# Patient Record
Sex: MALE | Race: BLACK OR AFRICAN AMERICAN | NOT HISPANIC OR LATINO | Employment: FULL TIME | URBAN - METROPOLITAN AREA
[De-identification: names, ages, dates, MRNs, and addresses within clinical notes are randomized per-mention and may not be internally consistent; named-entity substitution may affect disease eponyms.]

---

## 2017-02-22 ENCOUNTER — PATIENT MESSAGE (OUTPATIENT)
Dept: INTERNAL MEDICINE | Facility: CLINIC | Age: 27
End: 2017-02-22

## 2017-02-23 DIAGNOSIS — J11.1 INFLUENZA: Primary | ICD-10-CM

## 2017-02-23 RX ORDER — OSELTAMIVIR PHOSPHATE 75 MG/1
75 CAPSULE ORAL 2 TIMES DAILY
Qty: 10 CAPSULE | Refills: 0 | Status: SHIPPED | OUTPATIENT
Start: 2017-02-23 | End: 2017-02-28

## 2017-04-24 ENCOUNTER — PATIENT MESSAGE (OUTPATIENT)
Dept: INTERNAL MEDICINE | Facility: CLINIC | Age: 27
End: 2017-04-24

## 2017-04-25 ENCOUNTER — TELEPHONE (OUTPATIENT)
Dept: INTERNAL MEDICINE | Facility: CLINIC | Age: 27
End: 2017-04-25

## 2017-04-25 NOTE — TELEPHONE ENCOUNTER
Vitaly Hughes I was wondering if you could tell what's the best allergy medication to take

## 2017-04-25 NOTE — TELEPHONE ENCOUNTER
Vitaly Hughes I was wondering if you could tell what's the best allergy medication to take. Please advise

## 2017-04-25 NOTE — TELEPHONE ENCOUNTER
I would recommend OTC flonase and zyrtec (may need to take at night if makes you sleepy). Ok to use generic store brand version. Name brand not needed.

## 2017-07-03 ENCOUNTER — OFFICE VISIT (OUTPATIENT)
Dept: INTERNAL MEDICINE | Facility: CLINIC | Age: 27
End: 2017-07-03
Payer: COMMERCIAL

## 2017-07-03 ENCOUNTER — LAB VISIT (OUTPATIENT)
Dept: LAB | Facility: HOSPITAL | Age: 27
End: 2017-07-03
Attending: NURSE PRACTITIONER
Payer: COMMERCIAL

## 2017-07-03 VITALS
TEMPERATURE: 96 F | RESPIRATION RATE: 18 BRPM | BODY MASS INDEX: 34.84 KG/M2 | DIASTOLIC BLOOD PRESSURE: 69 MMHG | SYSTOLIC BLOOD PRESSURE: 127 MMHG | OXYGEN SATURATION: 98 % | WEIGHT: 248.88 LBS | HEIGHT: 71 IN | HEART RATE: 68 BPM

## 2017-07-03 DIAGNOSIS — L20.9 MILD ATOPIC DERMATITIS: ICD-10-CM

## 2017-07-03 DIAGNOSIS — Z11.3 SCREEN FOR STD (SEXUALLY TRANSMITTED DISEASE): ICD-10-CM

## 2017-07-03 DIAGNOSIS — Z00.00 ENCOUNTER FOR PREVENTIVE HEALTH EXAMINATION: Primary | ICD-10-CM

## 2017-07-03 DIAGNOSIS — Z00.00 ENCOUNTER FOR PREVENTIVE HEALTH EXAMINATION: ICD-10-CM

## 2017-07-03 LAB
ALBUMIN SERPL BCP-MCNC: 4.1 G/DL
ALP SERPL-CCNC: 87 U/L
ALT SERPL W/O P-5'-P-CCNC: 55 U/L
ANION GAP SERPL CALC-SCNC: 8 MMOL/L
AST SERPL-CCNC: 26 U/L
BASOPHILS # BLD AUTO: 0.02 K/UL
BASOPHILS NFR BLD: 0.3 %
BILIRUB SERPL-MCNC: 0.9 MG/DL
BUN SERPL-MCNC: 12 MG/DL
CALCIUM SERPL-MCNC: 9.7 MG/DL
CHLORIDE SERPL-SCNC: 102 MMOL/L
CO2 SERPL-SCNC: 28 MMOL/L
CREAT SERPL-MCNC: 1 MG/DL
DIFFERENTIAL METHOD: ABNORMAL
EOSINOPHIL # BLD AUTO: 0.1 K/UL
EOSINOPHIL NFR BLD: 1.4 %
ERYTHROCYTE [DISTWIDTH] IN BLOOD BY AUTOMATED COUNT: 12.6 %
EST. GFR  (AFRICAN AMERICAN): >60 ML/MIN/1.73 M^2
EST. GFR  (NON AFRICAN AMERICAN): >60 ML/MIN/1.73 M^2
GLUCOSE SERPL-MCNC: 89 MG/DL
HCT VFR BLD AUTO: 45.5 %
HGB BLD-MCNC: 15.8 G/DL
LYMPHOCYTES # BLD AUTO: 2.3 K/UL
LYMPHOCYTES NFR BLD: 36.6 %
MCH RBC QN AUTO: 31.3 PG
MCHC RBC AUTO-ENTMCNC: 34.7 %
MCV RBC AUTO: 90 FL
MONOCYTES # BLD AUTO: 0.3 K/UL
MONOCYTES NFR BLD: 4.7 %
NEUTROPHILS # BLD AUTO: 3.6 K/UL
NEUTROPHILS NFR BLD: 56.8 %
PLATELET # BLD AUTO: 199 K/UL
PMV BLD AUTO: 10.8 FL
POTASSIUM SERPL-SCNC: 4.2 MMOL/L
PROT SERPL-MCNC: 7.9 G/DL
RBC # BLD AUTO: 5.04 M/UL
SODIUM SERPL-SCNC: 138 MMOL/L
TSH SERPL DL<=0.005 MIU/L-ACNC: 0.72 UIU/ML
WBC # BLD AUTO: 6.39 K/UL

## 2017-07-03 PROCEDURE — 85025 COMPLETE CBC W/AUTO DIFF WBC: CPT | Mod: PO

## 2017-07-03 PROCEDURE — 86592 SYPHILIS TEST NON-TREP QUAL: CPT

## 2017-07-03 PROCEDURE — 80053 COMPREHEN METABOLIC PANEL: CPT | Mod: PO

## 2017-07-03 PROCEDURE — 86703 HIV-1/HIV-2 1 RESULT ANTBDY: CPT

## 2017-07-03 PROCEDURE — 99999 PR PBB SHADOW E&M-EST. PATIENT-LVL IV: CPT | Mod: PBBFAC,,, | Performed by: NURSE PRACTITIONER

## 2017-07-03 PROCEDURE — 36415 COLL VENOUS BLD VENIPUNCTURE: CPT | Mod: PO

## 2017-07-03 PROCEDURE — 87591 N.GONORRHOEAE DNA AMP PROB: CPT

## 2017-07-03 PROCEDURE — 80061 LIPID PANEL: CPT

## 2017-07-03 PROCEDURE — 99395 PREV VISIT EST AGE 18-39: CPT | Mod: S$GLB,,, | Performed by: NURSE PRACTITIONER

## 2017-07-03 PROCEDURE — 84443 ASSAY THYROID STIM HORMONE: CPT | Mod: PO

## 2017-07-03 RX ORDER — TRIAMCINOLONE ACETONIDE 1 MG/G
CREAM TOPICAL 2 TIMES DAILY
Qty: 45 G | Refills: 0 | Status: SHIPPED | OUTPATIENT
Start: 2017-07-03 | End: 2018-05-23 | Stop reason: ALTCHOICE

## 2017-07-03 NOTE — PATIENT INSTRUCTIONS
Managing Atopic Dermatitis     After bathing, gently pat your skin dry (dont rub). Apply moisturizer while your skin is still damp.   To manage your symptoms and help reduce the severity and frequency, try these self-care tips:  Caring for your skin  · Use a gentle, fragrance-free cleanser (or nonsoap cleanser) for bathing. Rinse well. Pat skin dry.  · Take warm, not hot, baths.   · Use moisturizer liberally right after you bathe, while your skin is still damp.  · Avoid scratching because it will cause more damage to your skin.   · Topical, over-the-counter hydrocortisone cream may help control mild symptoms.   Controlling your environment  · Avoid extremes of heat or cold.  · Avoid very humid or very dry air.  · If your home or office air is very dry, use a humidifier.  · Avoid allergens, such as dust, that may be present in bedding, carpets, plush toys, or rugs.  · Know that pet hair and dander can cause flare-ups.  Seeking medical treatment  Another way to keep symptoms under control is to seek medical treatment. Talk with your health care provider about the type of treatment that may work best for you. A topical treatment may be prescribed to put on the skin daily. Oral medications (taken by mouth) may also be prescribed. Among medications you may be given are antihistamines, antibiotics, or corticosteroids. Sometimes injections may be needed to control the symptoms, and you may even need antibiotics if skin infections occur. Treatments do not work the same way for every patient. So, if symptoms persist or get worse, ask your health care provider about other treatments.  Making lifestyle choices  · Manage the stress in your life.  · Wear loose-fitting cotton clothing that does not bind or rub the skin.  · Avoid contact with wool or other scratchy fabrics.  · Use fragrance-free products.  Getting good results  Now that you know more about atopic dermatitis, the next step is up to you. Follow your health care  providers treatment plan and your self-care routine. This will help bring atopic dermatitis under control. If your symptoms persist, be sure to let your health care provider know.   Date Last Reviewed: 5/10/2015  © 6292-8131 [employer] [address] [place] [address], [place] [address]. All rights reserved. This information is not intended as a substitute for professional medical care. Always follow your healthcare professional's instructions.        Atopic Dermatitis (Adult)  Atopic dermatitis is a dry, itchy, red rash. Its also called eczema. The rash is chronic, or ongoing. It can come and go over time. The disease is often passed down in families. It causes a problem with the skin barrier that makes the skin more sensitive to the environment and other factors. The increased skin sensitivity causes an itch, which causes scratching. Scratching can worsen the itching or also break the skin. This can put the skin at risk of infection.  The condition is most common in people with asthma, hay fever, hives, or dry or sensitive skin. The rash may be caused by extreme heat or heavy sweating. Skin irritants can cause the rash to flare up. These can include wool or silk clothing, grease, oils, some medicines, and harsh soaps and detergents. Emotional stress can also be a trigger.  Treatment is done to relieve the itching and inflammation of the skin.  Home care  Follow these tips to care for your condition:  · Keep the areas of rash clean by bathing at least every other day. Use lukewarm water to bathe. Dont use hot water, which can dry out the skin.  · Dont use soaps with strong detergents. Use mild soaps made for sensitive skin.  · Apply a cream or ointment to damp skin right after bathing.  · Avoid things that irritate your skin. Wear absorbent, soft fabrics next to the skin rather than rough or scratchy materials.  · Use mild laundry soap free of scents and perfumes. Make sure to rinse all the soap out of  your clothes.  · Treat any skin infection as directed.  · Use oral diphenhydramine to help reduce itching. This is an antihistamine you can buy at drug and grocery stores. It can make you sleepy, so use lower doses during the daytime. Or you can use loratadine. This is an antihistamine that will not make you sleepy. Do not use diphenhydramine if you have glaucoma or have trouble urinating due to an enlarged prostate.  Follow-up care  See your healthcare provider, or as advised. If your symptoms dont get better or if they get worse in the next 7 days, make an appointment with your healthcare provider.  When to seek medical advice  Call your healthcare provider right away  if any of these occur:  · Increasing area of redness or pain in the skin  · Yellow crusts or wet drainage from the rash  · Fever of 100.4°F (38°C) or higher, or as directed by your healthcare provider  Date Last Reviewed: 9/1/2016  © 5681-1830 [employer]. [address] [place] [address], [place] [address]. All rights reserved. This information is not intended as a substitute for professional medical care. Always follow your healthcare professional's instructions.

## 2017-07-03 NOTE — PROGRESS NOTES
Subjective:       Patient ID: Jorge Luis Gilmore is a 27 y.o. male.    Chief Complaint: Annual Exam    Mr Gilmore is here today for an annual physical. His only complaint is a rash      Rash   This is a chronic problem. The problem has been waxing and waning since onset. The affected locations include the back (neck). The rash is characterized by dryness and itchiness. He was exposed to nothing. (None) Past treatments include nothing.     Review of Systems   Constitutional: Negative.    Eyes: Negative.    Respiratory: Negative.    Cardiovascular: Negative.    Gastrointestinal: Positive for constipation (admits to low water intake). Negative for abdominal distention and nausea.   Endocrine: Negative.    Genitourinary: Negative.    Musculoskeletal: Negative.    Skin: Positive for rash.   Neurological: Positive for headaches (treats with ibuprofen with good results). Negative for dizziness and weakness.   Hematological: Negative.    Psychiatric/Behavioral: Negative.        Objective:      Physical Exam   Constitutional: He is oriented to person, place, and time. He appears well-developed and well-nourished. No distress.   HENT:   Head: Normocephalic and atraumatic.   Right Ear: Hearing, tympanic membrane, external ear and ear canal normal.   Left Ear: Hearing, tympanic membrane, external ear and ear canal normal.   Nose: Nose normal. No mucosal edema, rhinorrhea, sinus tenderness or nasal deformity.   Mouth/Throat: Oropharynx is clear and moist. No oral lesions. Normal dentition. No oropharyngeal exudate, posterior oropharyngeal edema or posterior oropharyngeal erythema.   Eyes: Conjunctivae, EOM and lids are normal. Pupils are equal, round, and reactive to light.   Neck: Normal range of motion and full passive range of motion without pain. Neck supple. No JVD present. No tracheal deviation present. No thyromegaly present.   Cardiovascular: Normal rate, regular rhythm, S1 normal, S2 normal, normal heart sounds and  intact distal pulses.    No murmur heard.  Pulmonary/Chest: Effort normal and breath sounds normal. No stridor. No respiratory distress.   Abdominal: Soft. Normal appearance and bowel sounds are normal. He exhibits no distension. There is no tenderness.   Musculoskeletal: Normal range of motion. He exhibits no edema or tenderness.   Lymphadenopathy:     He has no cervical adenopathy.   Neurological: He is alert and oriented to person, place, and time. He has normal reflexes.   Skin: Skin is warm, dry and intact. Rash (eczema type rash to neck and back) noted. He is not diaphoretic. No cyanosis. Nails show no clubbing.   Psychiatric: He has a normal mood and affect. His speech is normal and behavior is normal. Judgment and thought content normal. Cognition and memory are normal.       Assessment:       1. Encounter for preventive health examination    2. Mild atopic dermatitis        Plan:       *Encounter for preventive health examination  -     CBC auto differential; Future; Expected date: 07/03/2017  -     Comprehensive metabolic panel; Future; Expected date: 07/03/2017  -     Lipid panel; Future; Expected date: 07/03/2017  -     TSH; Future; Expected date: 07/03/2017    Mild atopic dermatitis  Change to gentle soap/ keep skin moisturized   -     triamcinolone acetonide 0.1% (KENALOG) 0.1 % cream; Apply topically 2 (two) times daily.  Dispense: 45 g; Refill: 0    Screen for STD (sexually transmitted disease)  -     RPR; Future; Expected date: 07/03/2017  -     HIV-1 and HIV-2 antibodies; Future; Expected date: 07/03/2017  -     C. trachomatis/N. gonorrhoeae by AMP DNA Urine    F/u per lab results, likely 1 year  **

## 2017-07-03 NOTE — LETTER
July 3, 2017               [facility]  Internal Medicine  [address] [address] [address]  Phone: [phone]   July 3, 2017     Patient: Jorge Luis Gilmore   YOB: 1990   Date of Visit: 7/3/2017       To Whom it May Concern:    Jorge Luis Gilmore was seen in my clinic on 7/3/2017. He may return to work on 7/3/2017. Please excuse his absence on 7/2/2017.     If you have any questions or concerns, please don't hesitate to call.    Sincerely,         RAJAT Corbett,FNP-C

## 2017-07-04 LAB
C TRACH DNA SPEC QL NAA+PROBE: NOT DETECTED
CHOLEST/HDLC SERPL: 4.6 {RATIO}
HDL/CHOLESTEROL RATIO: 21.7 %
HDLC SERPL-MCNC: 157 MG/DL
HDLC SERPL-MCNC: 34 MG/DL
LDLC SERPL CALC-MCNC: 101.2 MG/DL
N GONORRHOEA DNA SPEC QL NAA+PROBE: NOT DETECTED
NONHDLC SERPL-MCNC: 123 MG/DL
TRIGL SERPL-MCNC: 109 MG/DL

## 2017-07-05 ENCOUNTER — TELEPHONE (OUTPATIENT)
Dept: INTERNAL MEDICINE | Facility: CLINIC | Age: 27
End: 2017-07-05

## 2017-07-05 LAB
HIV 1+2 AB+HIV1 P24 AG SERPL QL IA: NEGATIVE
RPR SER QL: NORMAL

## 2017-07-05 NOTE — TELEPHONE ENCOUNTER
----- Message from RAJAT Corbett,FNP-C sent at 7/5/2017 10:53 AM CDT -----  All labs within acceptable range. Negative for STDs

## 2017-07-12 ENCOUNTER — TELEPHONE (OUTPATIENT)
Dept: INTERNAL MEDICINE | Facility: CLINIC | Age: 27
End: 2017-07-12

## 2018-05-23 ENCOUNTER — OFFICE VISIT (OUTPATIENT)
Dept: INTERNAL MEDICINE | Facility: CLINIC | Age: 28
End: 2018-05-23
Payer: COMMERCIAL

## 2018-05-23 VITALS
BODY MASS INDEX: 36.39 KG/M2 | OXYGEN SATURATION: 98 % | WEIGHT: 259.94 LBS | TEMPERATURE: 97 F | HEART RATE: 71 BPM | SYSTOLIC BLOOD PRESSURE: 127 MMHG | HEIGHT: 71 IN | DIASTOLIC BLOOD PRESSURE: 87 MMHG | RESPIRATION RATE: 18 BRPM

## 2018-05-23 DIAGNOSIS — L03.011 PARONYCHIA OF RIGHT THUMB: Primary | ICD-10-CM

## 2018-05-23 PROCEDURE — 99999 PR PBB SHADOW E&M-EST. PATIENT-LVL IV: CPT | Mod: PBBFAC,,, | Performed by: NURSE PRACTITIONER

## 2018-05-23 PROCEDURE — 3008F BODY MASS INDEX DOCD: CPT | Mod: CPTII,S$GLB,, | Performed by: NURSE PRACTITIONER

## 2018-05-23 PROCEDURE — 26010 DRAINAGE OF FINGER ABSCESS: CPT | Mod: RT,S$GLB,, | Performed by: NURSE PRACTITIONER

## 2018-05-23 PROCEDURE — 99213 OFFICE O/P EST LOW 20 MIN: CPT | Mod: 25,S$GLB,, | Performed by: NURSE PRACTITIONER

## 2018-05-23 RX ORDER — SULFAMETHOXAZOLE AND TRIMETHOPRIM 800; 160 MG/1; MG/1
1 TABLET ORAL 2 TIMES DAILY
Qty: 14 TABLET | Refills: 0 | Status: SHIPPED | OUTPATIENT
Start: 2018-05-23 | End: 2018-05-30

## 2018-05-23 NOTE — PATIENT INSTRUCTIONS
Paronychia of the Finger or Toe  Paronychia is an infection near a fingernail or toenail. It usually occurs when an opening in the cuticle or an ingrown toenail lets bacteria under the skin.  The infection will need to be drained if pus is present. If the infection has been caught early, you may need only antibiotic treatment. Healing will take about 1 to 2 weeks.  Home care  Follow these guidelines when caring for yourself at home:  · Clean and soak the toe or finger. Do this 2 times a day for the first 3 days. To do so:  ¨ Soak your foot or hand in a tub of warm water for 5 minutes. Or hold your toe or finger under a faucet of warm running water for 5 minutes.  ¨ Clean any crust away with soap and water using a cotton swab.  ¨ Put antibiotic ointment on the infected area.  · Change the dressing daily or any time it gets dirty.  · If you were given antibiotics, take them as directed until they are all gone.  · If your infection is on a toe, wear comfortable shoes with a lot of toe room. You can also wear open-toed sandals while your toe heals.  · You may use over-the-counter medicine (acetaminophen or ibuprofen to help with pain, unless another medicine was prescribed. If you have chronic liver or kidney disease, talk with your healthcare provider before using these medicines. Also talk with your provider if you've had a stomach ulcer or GI (gastrointestinal) bleeding.  Prevention  The following can prevent paronychia:  · Avoid cutting or playing with your cuticles at home.  · Don't bite your nails.  · Don't suck on your thumbs or fingers.  Follow-up care  Follow up with your healthcare provider, or as advised.  When to seek medical advice  Call your healthcare provider right away if any of these occur:  · Redness, pain, or swelling of the finger or toe gets worse  · Red streaks in the skin leading away from the wound  · Pus or fluid draining from the nail area  · Fever of 100.4ºF (38ºC) or higher, or as directed  by your provider  Date Last Reviewed: 8/1/2016  © 1734-9227 [employer]. [address] [place] [address], [place] [address]. All rights reserved. This information is not intended as a substitute for professional medical care. Always follow your healthcare professional's instructions.

## 2018-05-23 NOTE — PROGRESS NOTES
Subjective:       Patient ID: Jorge Luis Gilmore is a 28 y.o. male.    Chief Complaint: Hand Pain (left thumb)    Hand Pain    The incident occurred 3 to 5 days ago. The incident occurred at home. The injury mechanism was a direct blow. The pain is present in the left hand. The quality of the pain is described as aching. The pain does not radiate. The pain is moderate. The pain has been fluctuating since the incident. Pertinent negatives include no numbness or tingling. Nothing aggravates the symptoms. He has tried NSAIDs and acetaminophen for the symptoms. The treatment provided mild relief.     Review of Systems   Constitutional: Negative.    Respiratory: Negative.    Cardiovascular: Negative.    Musculoskeletal: Negative.    Skin: Positive for color change. Negative for wound.   Neurological: Negative for tingling, weakness, light-headedness and numbness.   Hematological: Negative.        Objective:      Physical Exam   Constitutional: He is oriented to person, place, and time. He appears well-developed. No distress.   Musculoskeletal: Normal range of motion. He exhibits no edema or tenderness.   Neurological: He is alert and oriented to person, place, and time. No sensory deficit.   Skin: He is not diaphoretic.            Assessment:       1. Paronychia of right thumb        Plan:     Problem List Items Addressed This Visit        ID    Paronychia of right thumb - Primary    Current Assessment & Plan     Keep area clean and dry. Can soak TID see AVS for further instruction         Relevant Medications    sulfamethoxazole-trimethoprim 800-160mg (BACTRIM DS) 800-160 mg Tab    Other Relevant Orders    INCISION AND DRAINAGE (Completed)        Follow-up if symptoms worsen or fail to improve.

## 2018-05-23 NOTE — PROCEDURES
Incision & Drainage  Date/Time: 5/23/2018 9:02 AM  Performed by: CHELI KIDD  Authorized by: CHELI KIDD     Consent Done?:  Yes (Verbal)    Type:  Abscess  Body area:  Upper extremity  Location details:  Left thumb  Incision type:  Single straight  Drainage:  Pus and bloody  Drainage amount:  Moderate  Wound treatment:  Wound left open, incision, drainage and expression of material  Patient tolerance:  Patient tolerated the procedure well with no immediate complications

## 2018-05-23 NOTE — LETTER
May 23, 2018                 [facility] Internal Medicine  Internal Medicine  [address] [place] [address]  Phone: [phone]   May 23, 2018     Patient: Jorge Luis Gilmore   YOB: 1990   Date of Visit: 5/23/2018       To Whom it May Concern:    Jorge Luis Gilmore was seen in my clinic on 5/23/2018. He may return to work on 5/24/2018.    If you have any questions or concerns, please don't hesitate to call.    Sincerely,         RAJAT Corbett,FNP-C

## 2018-11-23 ENCOUNTER — TELEPHONE (OUTPATIENT)
Dept: INTERNAL MEDICINE | Facility: CLINIC | Age: 28
End: 2018-11-23

## 2018-11-23 NOTE — TELEPHONE ENCOUNTER
Retuned call to pt. Informed pt that haleigh is no longer here and her could schedule an appt with Dr. Wyatt or Dr. Burks. Pt stated that he will schedule his appt through [employer].

## 2018-11-23 NOTE — TELEPHONE ENCOUNTER
----- Message from Schuyler Chávez sent at 11/23/2018  2:41 PM CST -----  Contact: [employer]  Message from [employer], System Message sent at 11/23/2018  2:21 PM CST -----    Appointment Request From: Jorge Luis Gilomre    With Provider: Alie    Preferred Date Range: 11/27/2018 - 11/30/2018    Preferred Times: Any time    Reason for visit: Existing Patient    Comments:  Serve back pain [phone]

## 2019-04-02 ENCOUNTER — OFFICE VISIT (OUTPATIENT)
Dept: INTERNAL MEDICINE | Facility: CLINIC | Age: 29
End: 2019-04-02
Payer: COMMERCIAL

## 2019-04-02 VITALS
HEIGHT: 71 IN | DIASTOLIC BLOOD PRESSURE: 62 MMHG | WEIGHT: 270.75 LBS | OXYGEN SATURATION: 98 % | RESPIRATION RATE: 19 BRPM | SYSTOLIC BLOOD PRESSURE: 128 MMHG | TEMPERATURE: 96 F | HEART RATE: 76 BPM | BODY MASS INDEX: 37.9 KG/M2

## 2019-04-02 DIAGNOSIS — Z28.9 DELAYED IMMUNIZATIONS: ICD-10-CM

## 2019-04-02 DIAGNOSIS — J06.9 VIRAL UPPER RESPIRATORY TRACT INFECTION: Primary | ICD-10-CM

## 2019-04-02 PROCEDURE — 96372 PR INJECTION,THERAP/PROPH/DIAG2ST, IM OR SUBCUT: ICD-10-PCS | Mod: S$GLB,,, | Performed by: FAMILY MEDICINE

## 2019-04-02 PROCEDURE — 99999 PR PBB SHADOW E&M-EST. PATIENT-LVL III: CPT | Mod: PBBFAC,,, | Performed by: FAMILY MEDICINE

## 2019-04-02 PROCEDURE — 3008F BODY MASS INDEX DOCD: CPT | Mod: CPTII,S$GLB,, | Performed by: FAMILY MEDICINE

## 2019-04-02 PROCEDURE — 99999 PR PBB SHADOW E&M-EST. PATIENT-LVL III: ICD-10-PCS | Mod: PBBFAC,,, | Performed by: FAMILY MEDICINE

## 2019-04-02 PROCEDURE — 99214 OFFICE O/P EST MOD 30 MIN: CPT | Mod: 25,S$GLB,, | Performed by: FAMILY MEDICINE

## 2019-04-02 PROCEDURE — 99214 PR OFFICE/OUTPT VISIT, EST, LEVL IV, 30-39 MIN: ICD-10-PCS | Mod: 25,S$GLB,, | Performed by: FAMILY MEDICINE

## 2019-04-02 PROCEDURE — 96372 THER/PROPH/DIAG INJ SC/IM: CPT | Mod: S$GLB,,, | Performed by: FAMILY MEDICINE

## 2019-04-02 PROCEDURE — 3008F PR BODY MASS INDEX (BMI) DOCUMENTED: ICD-10-PCS | Mod: CPTII,S$GLB,, | Performed by: FAMILY MEDICINE

## 2019-04-02 RX ORDER — BETAMETHASONE SODIUM PHOSPHATE AND BETAMETHASONE ACETATE 3; 3 MG/ML; MG/ML
6 INJECTION, SUSPENSION INTRA-ARTICULAR; INTRALESIONAL; INTRAMUSCULAR; SOFT TISSUE
Status: COMPLETED | OUTPATIENT
Start: 2019-04-02 | End: 2019-04-02

## 2019-04-02 RX ORDER — PROMETHAZINE HYDROCHLORIDE AND DEXTROMETHORPHAN HYDROBROMIDE 6.25; 15 MG/5ML; MG/5ML
5 SYRUP ORAL NIGHTLY
Qty: 118 ML | Refills: 0 | Status: SHIPPED | OUTPATIENT
Start: 2019-04-02 | End: 2019-04-16

## 2019-04-02 RX ADMIN — BETAMETHASONE SODIUM PHOSPHATE AND BETAMETHASONE ACETATE 6 MG: 3; 3 INJECTION, SUSPENSION INTRA-ARTICULAR; INTRALESIONAL; INTRAMUSCULAR; SOFT TISSUE at 09:04

## 2019-04-02 NOTE — PROGRESS NOTES
Subjective:       Patient ID: Jorge Luis Gilmroe is a 29 y.o. male.    Chief Complaint: Nasal Congestion (wheezing)    URI    This is a new problem. The current episode started in the past 7 days. The problem has been gradually worsening. There has been no fever. Associated symptoms include congestion, headaches, rhinorrhea, sinus pain, sneezing, a sore throat and wheezing. Pertinent negatives include no abdominal pain, chest pain, coughing, rash or vomiting. He has tried nothing for the symptoms. The treatment provided no relief.     Review of Systems   HENT: Positive for congestion, rhinorrhea, sinus pain, sneezing and sore throat.    Respiratory: Positive for wheezing. Negative for cough.    Cardiovascular: Negative for chest pain.   Gastrointestinal: Negative for abdominal pain and vomiting.   Skin: Negative for rash.   Neurological: Positive for headaches.       Objective:      Physical Exam   Constitutional: He appears well-developed and well-nourished. No distress.   HENT:   Head: Normocephalic and atraumatic.   Nose: Nose normal. Right sinus exhibits no maxillary sinus tenderness and no frontal sinus tenderness. Left sinus exhibits no maxillary sinus tenderness and no frontal sinus tenderness.   Mouth/Throat: Oropharynx is clear and moist.   Pulmonary/Chest: Effort normal and breath sounds normal. No respiratory distress. He has no wheezes.   Cough on exam.  Nasal congestion   Skin: Skin is warm and dry. No rash noted. He is not diaphoretic. No erythema.   Nursing note and vitals reviewed.      Assessment:       1. Viral upper respiratory tract infection    2. Delayed immunizations        Plan:     Problem List Items Addressed This Visit        ENT    Viral upper respiratory tract infection - Primary    Relevant Medications    betamethasone acetate-betamethasone sodium phosphate injection 6 mg (Completed)    promethazine-dextromethorphan (PROMETHAZINE-DM) 6.25-15 mg/5 mL Syrp       ID    Delayed  immunizations    Relevant Orders    Tdap Vaccine

## 2019-04-15 PROBLEM — J06.9 VIRAL UPPER RESPIRATORY TRACT INFECTION: Status: RESOLVED | Noted: 2019-04-02 | Resolved: 2019-04-15

## 2019-04-15 PROBLEM — Z00.00 ROUTINE GENERAL MEDICAL EXAMINATION AT A HEALTH CARE FACILITY: Status: ACTIVE | Noted: 2019-04-15

## 2019-04-15 PROBLEM — L03.011 PARONYCHIA OF RIGHT THUMB: Status: RESOLVED | Noted: 2018-05-23 | Resolved: 2019-04-15

## 2019-04-15 NOTE — PROGRESS NOTES
Subjective:       Patient ID: Jorge Luis Gilmore is a 29 y.o. male.    Chief Complaint: Annual Exam (Wellness)    Subjective:     Jorge Luis Gilmore is a 29 y.o. male and is here for a comprehensive physical exam. The patient reports no problems.     Do you take any herbs or supplements that were not prescribed by a doctor? no  Are you taking calcium supplements? no  Are you taking aspirin daily? no     History:  Any STD's in the past? chlamydia    The following portions of the patient's history were reviewed and updated as appropriate: allergies, current medications, past family history, past medical history, past social history, past surgical history and problem list.    Review of Systems  Do you have pain that bothers you in your daily life? no  Pertinent items are noted in HPI.     Other    Routine general medical examination at a health care facility      2. Patient Counseling:  --Nutrition: Stressed importance of moderation in sodium/caffeine intake, saturated fat and cholesterol, caloric balance, sufficient intake of fresh fruits, vegetables, fiber, calcium, iron, and 1 mg of folate supplement per day (for females capable of pregnancy).  --Discussed the issue of estrogen replacement, calcium supplement, and the daily use of baby aspirin.  --Exercise: Stressed the importance of regular exercise.   --Substance Abuse: Discussed cessation/primary prevention of tobacco, alcohol, or other drug use; driving or other dangerous activities under the influence; availability of treatment for abuse.    --Sexuality: Discussed sexually transmitted diseases, partner selection, use of condoms, avoidance of unintended pregnancy  and contraceptive alternatives.   --Injury prevention: Discussed safety belts, safety helmets, smoke detector, smoking near bedding or upholstery.   --Dental health: Discussed importance of regular tooth brushing, flossing, and dental visits.  --Immunizations reviewed.  --Discussed benefits  of screening colonoscopy.  --After hours service discussed with patient    3. Discussed the patient's BMI with him.  The BMI elevated.  4. Follow up as needed for acute illness      Review of Systems   Constitutional: Negative for activity change.   HENT: Negative for ear pain.    Eyes: Negative for pain.   Respiratory: Negative for shortness of breath.    Cardiovascular: Negative for chest pain.   Gastrointestinal: Negative for abdominal pain.   Genitourinary: Negative for dysuria.   Musculoskeletal: Negative for neck pain.   Skin: Negative for rash.   Neurological: Negative for headaches.       Objective:      Physical Exam   Constitutional: He appears well-developed and well-nourished. No distress.   HENT:   Head: Normocephalic and atraumatic.   Cardiovascular: Normal rate and regular rhythm.   Pulmonary/Chest: Effort normal and breath sounds normal. No respiratory distress. He has no wheezes.   Abdominal: Soft. Bowel sounds are normal. There is no tenderness.   Musculoskeletal: He exhibits no edema.   Neurological: He is alert.   Skin: Skin is warm and dry. No rash noted. He is not diaphoretic. No erythema.   Multiple tattoos   Nursing note and vitals reviewed.      Assessment:       1. Routine general medical examination at a health care facility        Plan:     Problem List Items Addressed This Visit        Other    Routine general medical examination at a health care facility - Primary    Relevant Orders    CBC auto differential    Comprehensive metabolic panel    Hemoglobin A1c    HIV 1/2 Ag/Ab (4th Gen)    Lipid panel    TSH    Tdap Vaccine    Hepatitis C antibody

## 2019-04-16 ENCOUNTER — OFFICE VISIT (OUTPATIENT)
Dept: INTERNAL MEDICINE | Facility: CLINIC | Age: 29
End: 2019-04-16
Payer: COMMERCIAL

## 2019-04-16 ENCOUNTER — LAB VISIT (OUTPATIENT)
Dept: LAB | Facility: HOSPITAL | Age: 29
End: 2019-04-16
Attending: FAMILY MEDICINE
Payer: COMMERCIAL

## 2019-04-16 VITALS
RESPIRATION RATE: 16 BRPM | WEIGHT: 266.31 LBS | TEMPERATURE: 97 F | DIASTOLIC BLOOD PRESSURE: 65 MMHG | HEIGHT: 71 IN | HEART RATE: 73 BPM | SYSTOLIC BLOOD PRESSURE: 118 MMHG | OXYGEN SATURATION: 98 % | BODY MASS INDEX: 37.28 KG/M2

## 2019-04-16 DIAGNOSIS — Z00.00 ROUTINE GENERAL MEDICAL EXAMINATION AT A HEALTH CARE FACILITY: ICD-10-CM

## 2019-04-16 DIAGNOSIS — Z00.00 ROUTINE GENERAL MEDICAL EXAMINATION AT A HEALTH CARE FACILITY: Primary | ICD-10-CM

## 2019-04-16 LAB
ALBUMIN SERPL BCP-MCNC: 4.2 G/DL (ref 3.5–5.2)
ALP SERPL-CCNC: 97 U/L (ref 55–135)
ALT SERPL W/O P-5'-P-CCNC: 28 U/L (ref 10–44)
ANION GAP SERPL CALC-SCNC: 9 MMOL/L (ref 8–16)
AST SERPL-CCNC: 18 U/L (ref 10–40)
BASOPHILS # BLD AUTO: 0.02 K/UL (ref 0–0.2)
BASOPHILS NFR BLD: 0.3 % (ref 0–1.9)
BILIRUB SERPL-MCNC: 0.8 MG/DL (ref 0.1–1)
BUN SERPL-MCNC: 12 MG/DL (ref 6–20)
CALCIUM SERPL-MCNC: 9.8 MG/DL (ref 8.7–10.5)
CHLORIDE SERPL-SCNC: 102 MMOL/L (ref 95–110)
CHOLEST SERPL-MCNC: 170 MG/DL (ref 120–199)
CHOLEST/HDLC SERPL: 4.7 {RATIO} (ref 2–5)
CO2 SERPL-SCNC: 27 MMOL/L (ref 23–29)
CREAT SERPL-MCNC: 1.1 MG/DL (ref 0.5–1.4)
DIFFERENTIAL METHOD: ABNORMAL
EOSINOPHIL # BLD AUTO: 0.1 K/UL (ref 0–0.5)
EOSINOPHIL NFR BLD: 1.7 % (ref 0–8)
ERYTHROCYTE [DISTWIDTH] IN BLOOD BY AUTOMATED COUNT: 12.4 % (ref 11.5–14.5)
EST. GFR  (AFRICAN AMERICAN): >60 ML/MIN/1.73 M^2
EST. GFR  (NON AFRICAN AMERICAN): >60 ML/MIN/1.73 M^2
ESTIMATED AVG GLUCOSE: 88 MG/DL (ref 68–131)
GLUCOSE SERPL-MCNC: 90 MG/DL (ref 70–110)
HBA1C MFR BLD HPLC: 4.7 % (ref 4–5.6)
HCT VFR BLD AUTO: 45.9 % (ref 40–54)
HDLC SERPL-MCNC: 36 MG/DL (ref 40–75)
HDLC SERPL: 21.2 % (ref 20–50)
HGB BLD-MCNC: 15.9 G/DL (ref 14–18)
LDLC SERPL CALC-MCNC: 114.6 MG/DL (ref 63–159)
LYMPHOCYTES # BLD AUTO: 2.1 K/UL (ref 1–4.8)
LYMPHOCYTES NFR BLD: 36.4 % (ref 18–48)
MCH RBC QN AUTO: 31.2 PG (ref 27–31)
MCHC RBC AUTO-ENTMCNC: 34.6 G/DL (ref 32–36)
MCV RBC AUTO: 90 FL (ref 82–98)
MONOCYTES # BLD AUTO: 0.3 K/UL (ref 0.3–1)
MONOCYTES NFR BLD: 4.7 % (ref 4–15)
NEUTROPHILS # BLD AUTO: 3.2 K/UL (ref 1.8–7.7)
NEUTROPHILS NFR BLD: 56.7 % (ref 38–73)
NONHDLC SERPL-MCNC: 134 MG/DL
PLATELET # BLD AUTO: 207 K/UL (ref 150–350)
PMV BLD AUTO: 10.8 FL (ref 9.2–12.9)
POTASSIUM SERPL-SCNC: 4.1 MMOL/L (ref 3.5–5.1)
PROT SERPL-MCNC: 8 G/DL (ref 6–8.4)
RBC # BLD AUTO: 5.1 M/UL (ref 4.6–6.2)
SODIUM SERPL-SCNC: 138 MMOL/L (ref 136–145)
TRIGL SERPL-MCNC: 97 MG/DL (ref 30–150)
TSH SERPL DL<=0.005 MIU/L-ACNC: 1.35 UIU/ML (ref 0.4–4)
WBC # BLD AUTO: 5.72 K/UL (ref 3.9–12.7)

## 2019-04-16 PROCEDURE — 84443 ASSAY THYROID STIM HORMONE: CPT | Mod: PO

## 2019-04-16 PROCEDURE — 80061 LIPID PANEL: CPT

## 2019-04-16 PROCEDURE — 83036 HEMOGLOBIN GLYCOSYLATED A1C: CPT

## 2019-04-16 PROCEDURE — 86703 HIV-1/HIV-2 1 RESULT ANTBDY: CPT

## 2019-04-16 PROCEDURE — 36415 COLL VENOUS BLD VENIPUNCTURE: CPT | Mod: PO

## 2019-04-16 PROCEDURE — 99395 PREV VISIT EST AGE 18-39: CPT | Mod: 25,S$GLB,, | Performed by: FAMILY MEDICINE

## 2019-04-16 PROCEDURE — 90715 TDAP VACCINE 7 YRS/> IM: CPT | Mod: S$GLB,,, | Performed by: FAMILY MEDICINE

## 2019-04-16 PROCEDURE — 99999 PR PBB SHADOW E&M-EST. PATIENT-LVL III: CPT | Mod: PBBFAC,,, | Performed by: FAMILY MEDICINE

## 2019-04-16 PROCEDURE — 90715 TDAP VACCINE GREATER THAN OR EQUAL TO 7YO IM: ICD-10-PCS | Mod: S$GLB,,, | Performed by: FAMILY MEDICINE

## 2019-04-16 PROCEDURE — 90471 IMMUNIZATION ADMIN: CPT | Mod: S$GLB,,, | Performed by: FAMILY MEDICINE

## 2019-04-16 PROCEDURE — 99999 PR PBB SHADOW E&M-EST. PATIENT-LVL III: ICD-10-PCS | Mod: PBBFAC,,, | Performed by: FAMILY MEDICINE

## 2019-04-16 PROCEDURE — 86803 HEPATITIS C AB TEST: CPT

## 2019-04-16 PROCEDURE — 85025 COMPLETE CBC W/AUTO DIFF WBC: CPT | Mod: PO

## 2019-04-16 PROCEDURE — 90471 TDAP VACCINE GREATER THAN OR EQUAL TO 7YO IM: ICD-10-PCS | Mod: S$GLB,,, | Performed by: FAMILY MEDICINE

## 2019-04-16 PROCEDURE — 80053 COMPREHEN METABOLIC PANEL: CPT | Mod: PO

## 2019-04-16 PROCEDURE — 99395 PR PREVENTIVE VISIT,EST,18-39: ICD-10-PCS | Mod: 25,S$GLB,, | Performed by: FAMILY MEDICINE

## 2019-04-17 LAB
HCV AB SERPL QL IA: NEGATIVE
HIV 1+2 AB+HIV1 P24 AG SERPL QL IA: NEGATIVE

## 2019-04-17 NOTE — PROGRESS NOTES
Pt Icelandic speaking, family to translate arrives c/o high BP, feeling tired, and pressure/tightness in chest, making it feel harder to breathe.  Also reports abd distention, bloating, nbnb vomiting 3x pta. Resps even/unlabored on RA, appearing in no distress. PMHx HTN, DM2, Gastritis. BP high in triage, took meds as rx'd.  EKG obtained Results have been reviewed . All labs are within normal range.   If you have any questions please feel free to contact me.

## 2019-07-10 ENCOUNTER — OFFICE VISIT (OUTPATIENT)
Dept: INTERNAL MEDICINE | Facility: CLINIC | Age: 29
End: 2019-07-10
Payer: COMMERCIAL

## 2019-07-10 VITALS
DIASTOLIC BLOOD PRESSURE: 65 MMHG | HEIGHT: 71 IN | TEMPERATURE: 97 F | WEIGHT: 273.13 LBS | RESPIRATION RATE: 16 BRPM | SYSTOLIC BLOOD PRESSURE: 120 MMHG | OXYGEN SATURATION: 97 % | HEART RATE: 72 BPM | BODY MASS INDEX: 38.24 KG/M2

## 2019-07-10 DIAGNOSIS — S39.012A STRAIN OF LUMBAR REGION, INITIAL ENCOUNTER: Primary | ICD-10-CM

## 2019-07-10 PROBLEM — Z00.00 ROUTINE GENERAL MEDICAL EXAMINATION AT A HEALTH CARE FACILITY: Status: RESOLVED | Noted: 2019-04-15 | Resolved: 2019-07-10

## 2019-07-10 PROBLEM — Z28.9 DELAYED IMMUNIZATIONS: Status: RESOLVED | Noted: 2019-04-02 | Resolved: 2019-07-10

## 2019-07-10 PROCEDURE — 99999 PR PBB SHADOW E&M-EST. PATIENT-LVL III: CPT | Mod: PBBFAC,,, | Performed by: FAMILY MEDICINE

## 2019-07-10 PROCEDURE — 3008F BODY MASS INDEX DOCD: CPT | Mod: CPTII,S$GLB,, | Performed by: FAMILY MEDICINE

## 2019-07-10 PROCEDURE — 96372 THER/PROPH/DIAG INJ SC/IM: CPT | Mod: S$GLB,,, | Performed by: FAMILY MEDICINE

## 2019-07-10 PROCEDURE — 99214 OFFICE O/P EST MOD 30 MIN: CPT | Mod: 25,S$GLB,, | Performed by: FAMILY MEDICINE

## 2019-07-10 PROCEDURE — 99999 PR PBB SHADOW E&M-EST. PATIENT-LVL III: ICD-10-PCS | Mod: PBBFAC,,, | Performed by: FAMILY MEDICINE

## 2019-07-10 PROCEDURE — 3008F PR BODY MASS INDEX (BMI) DOCUMENTED: ICD-10-PCS | Mod: CPTII,S$GLB,, | Performed by: FAMILY MEDICINE

## 2019-07-10 PROCEDURE — 96372 PR INJECTION,THERAP/PROPH/DIAG2ST, IM OR SUBCUT: ICD-10-PCS | Mod: S$GLB,,, | Performed by: FAMILY MEDICINE

## 2019-07-10 PROCEDURE — 99214 PR OFFICE/OUTPT VISIT, EST, LEVL IV, 30-39 MIN: ICD-10-PCS | Mod: 25,S$GLB,, | Performed by: FAMILY MEDICINE

## 2019-07-10 RX ORDER — MELOXICAM 15 MG/1
15 TABLET ORAL DAILY
Qty: 30 TABLET | Refills: 0 | Status: SHIPPED | OUTPATIENT
Start: 2019-07-10 | End: 2019-08-09

## 2019-07-10 RX ORDER — CYCLOBENZAPRINE HCL 10 MG
10 TABLET ORAL NIGHTLY
Qty: 14 TABLET | Refills: 0 | Status: SHIPPED | OUTPATIENT
Start: 2019-07-10 | End: 2019-07-24

## 2019-07-10 RX ORDER — KETOROLAC TROMETHAMINE 30 MG/ML
60 INJECTION, SOLUTION INTRAMUSCULAR; INTRAVENOUS ONCE
Status: COMPLETED | OUTPATIENT
Start: 2019-07-10 | End: 2019-07-10

## 2019-07-10 RX ORDER — BETAMETHASONE SODIUM PHOSPHATE AND BETAMETHASONE ACETATE 3; 3 MG/ML; MG/ML
6 INJECTION, SUSPENSION INTRA-ARTICULAR; INTRALESIONAL; INTRAMUSCULAR; SOFT TISSUE
Status: COMPLETED | OUTPATIENT
Start: 2019-07-10 | End: 2019-07-10

## 2019-07-10 RX ADMIN — KETOROLAC TROMETHAMINE 60 MG: 30 INJECTION, SOLUTION INTRAMUSCULAR; INTRAVENOUS at 01:07

## 2019-07-10 RX ADMIN — BETAMETHASONE SODIUM PHOSPHATE AND BETAMETHASONE ACETATE 6 MG: 3; 3 INJECTION, SUSPENSION INTRA-ARTICULAR; INTRALESIONAL; INTRAMUSCULAR; SOFT TISSUE at 01:07

## 2019-07-10 NOTE — PROGRESS NOTES
Subjective:       Patient ID: Jorge Luis Gilmore is a 29 y.o. male.    Chief Complaint: Rash (started 07/08/19) and Low-back Pain    Low-back Pain   This is a new problem. The current episode started more than 1 month ago. The problem occurs constantly. The problem has been gradually worsening. Associated symptoms include congestion. Pertinent negatives include no chest pain, coughing, fatigue, fever, nausea, numbness, sore throat, vomiting or weakness. The symptoms are aggravated by bending (walking long distances). He has tried nothing for the symptoms. The treatment provided no relief.     Review of Systems   Constitutional: Negative for fatigue and fever.   HENT: Positive for congestion and rhinorrhea. Negative for sore throat.    Eyes: Negative for pain.   Respiratory: Negative for cough and shortness of breath.    Cardiovascular: Negative for chest pain.   Gastrointestinal: Negative for diarrhea, nausea and vomiting.   Neurological: Negative for weakness and numbness.       Objective:      Physical Exam   Constitutional: He appears well-developed and well-nourished. No distress.   HENT:   Head: Normocephalic and atraumatic.   Pulmonary/Chest: Effort normal and breath sounds normal. No respiratory distress. He has no wheezes.   Musculoskeletal: Normal range of motion. He exhibits no edema, tenderness or deformity.   No fasciculations to paraspinal muscles.  Mild ttp.   Skin: Skin is warm and dry. No rash noted. He is not diaphoretic. No erythema.   Nursing note and vitals reviewed.      Assessment:       1. Strain of lumbar region, initial encounter        Plan:     Problem List Items Addressed This Visit        Orthopedic    Lumbar strain - Primary    Relevant Medications    ketorolac injection 60 mg (Start on 7/10/2019  2:45 PM)    betamethasone acetate-betamethasone sodium phosphate injection 6 mg (Start on 7/10/2019  1:45 PM)    meloxicam (MOBIC) 15 MG tablet    cyclobenzaprine (FLEXERIL) 10 MG tablet

## 2019-07-10 NOTE — PATIENT INSTRUCTIONS
Lumbar Stretch (Flexibility)    1. Lie on your back on the floor, with your knees bent and your feet flat on the floor. Dont press your neck or lower back to the floor.  2. Pull one knee up toward your chest. Clasp your hands under your thigh to help pull.  3. Hold for 30 to 60 seconds. Lower your leg back down to the floor.  4. Repeat 2 times, or as instructed.  5. Switch legs and repeat.  Date Last Reviewed: 3/10/2016  © 0984-2268 [employer]. [address] [place] [address]. All rights reserved. This information is not intended as a substitute for professional medical care. Always follow your healthcare professional's instructions.

## 2019-08-13 ENCOUNTER — OFFICE VISIT (OUTPATIENT)
Dept: INTERNAL MEDICINE | Facility: CLINIC | Age: 29
End: 2019-08-13
Payer: COMMERCIAL

## 2019-08-13 VITALS
OXYGEN SATURATION: 97 % | TEMPERATURE: 98 F | BODY MASS INDEX: 38.46 KG/M2 | WEIGHT: 274.69 LBS | SYSTOLIC BLOOD PRESSURE: 136 MMHG | DIASTOLIC BLOOD PRESSURE: 63 MMHG | HEIGHT: 71 IN | HEART RATE: 68 BPM | RESPIRATION RATE: 18 BRPM

## 2019-08-13 DIAGNOSIS — J01.91 ACUTE RECURRENT SINUSITIS, UNSPECIFIED LOCATION: ICD-10-CM

## 2019-08-13 DIAGNOSIS — J06.9 VIRAL URI: Primary | ICD-10-CM

## 2019-08-13 DIAGNOSIS — S39.012D STRAIN OF LUMBAR REGION, SUBSEQUENT ENCOUNTER: ICD-10-CM

## 2019-08-13 PROCEDURE — 99999 PR PBB SHADOW E&M-EST. PATIENT-LVL IV: CPT | Mod: PBBFAC,,, | Performed by: NURSE PRACTITIONER

## 2019-08-13 PROCEDURE — 3008F BODY MASS INDEX DOCD: CPT | Mod: CPTII,S$GLB,, | Performed by: NURSE PRACTITIONER

## 2019-08-13 PROCEDURE — 99999 PR PBB SHADOW E&M-EST. PATIENT-LVL IV: ICD-10-PCS | Mod: PBBFAC,,, | Performed by: NURSE PRACTITIONER

## 2019-08-13 PROCEDURE — 3008F PR BODY MASS INDEX (BMI) DOCUMENTED: ICD-10-PCS | Mod: CPTII,S$GLB,, | Performed by: NURSE PRACTITIONER

## 2019-08-13 PROCEDURE — 99214 OFFICE O/P EST MOD 30 MIN: CPT | Mod: S$GLB,,, | Performed by: NURSE PRACTITIONER

## 2019-08-13 PROCEDURE — 99214 PR OFFICE/OUTPT VISIT, EST, LEVL IV, 30-39 MIN: ICD-10-PCS | Mod: S$GLB,,, | Performed by: NURSE PRACTITIONER

## 2019-08-13 RX ORDER — FLUTICASONE PROPIONATE 50 MCG
1 SPRAY, SUSPENSION (ML) NASAL DAILY
Qty: 16 G | Refills: 1 | Status: SHIPPED | OUTPATIENT
Start: 2019-08-13 | End: 2020-03-18 | Stop reason: ALTCHOICE

## 2019-08-13 RX ORDER — METHYLPREDNISOLONE 4 MG/1
TABLET ORAL
Qty: 21 TABLET | Refills: 0 | Status: SHIPPED | OUTPATIENT
Start: 2019-08-13 | End: 2019-09-03

## 2019-08-13 NOTE — PATIENT INSTRUCTIONS
OTC Claritin D or Mucinex D for sinusitis symptoms. Increase water intake. OTC zyrtec daily. Use Flonase as discussed.     Self-Care for Sinusitis     Drinking plenty of water can help sinuses drain.   Sinusitis can often be managed with self-care. Self-care can keep sinuses moist and make you feel more comfortable. Remember to follow your doctor's instructions closely. This can make a big difference in getting your sinus problem under control.  Drink fluids  Drinking extra fluids helps thin your mucus. This lets it drain from your sinuses more easily. Have a glass of water every hour or two. A humidifier helps in much the same way. Fluids can also offset the drying effects of certain medicines. If you use a humidifier, follow the product maker's instructions on how to use it. Clean it on a regular schedule.  Use saltwater rinses  Rinses help keep your sinuses and nose moist. Mix a teaspoon of salt in 8 ounces of fresh, warm water. Use a bulb syringe to gently squirt the water into your nose a few times a day. You can also buy ready-made saline nasal sprays.  Apply hot or cold packs  Applying heat to the area surrounding your sinuses may make you feel more comfortable. Use a hot water bottle or a hand towel dipped in hot water. Some people also find ice packs effective for relieving pain.  Medicines  Your doctor may prescribe medications to help treat your sinusitis. If you have an infection, antibiotics can help clear it up. If you are prescribed antibiotics, take all pills on schedule until they are gone, even if you feel better. Decongestants help relieve swelling. Use decongestant sprays for short periods only under the direction of your doctor. If you have allergies, your doctor may prescribe medications to help relieve them.   Date Last Reviewed: 10/1/2016  © 8261-7265 [employer]. [address] [place] [address], [place] [address]. All rights reserved. This information is not intended as a substitute for  professional medical care. Always follow your healthcare professional's instructions.        Sinusitis, No Antibiotic Treatment (Child)  The sinuses are air-filled spaces in the skull. They are behind the forehead, in the nasal bones and cheeks, and around the eyes. When sinuses are healthy, air moves freely and mucus drains. When a child has a cold or an allergy, the lining of the nose and sinuses can become swollen. This is called sinusitis.  Sinusitis often starts with a cold. Cold symptoms usually go away in 5 or 10 days. If sinusitis develops, though, the symptoms continue and may even get worse. Your child may have pain or swelling in the face, sore throat, headache, or bad breath.  The healthcare provider has determined that your childs sinusitis is not caused by bacteria. No antibiotics are needed. The provider may prescribe pain medicine, saline drops for the nose, or a decongestant to help ease symptoms. Symptoms usually get better in 2 to 3 days.  Home care  Follow these guidelines when caring for your child at home:  · You may be told to give your child saline nasal drops or a decongestant. Follow instructions for children when using these medicines.  · If your child has pain, give him or her pain medicine as advised by your childs provider. Don't give your child aspirin unless told to do so. Don't give your child any other medicine without first asking your child's healthcare provider.   · Give your child plenty of time to rest. Try to make your child as comfortable as possible. Some children may be distracted by quiet activities.  · Encourage your child to drink liquids. Toddlers or older children may prefer cold drinks, frozen desserts, or popsicles. They may also like warm chicken soup or beverages with lemon and honey. Don't give honey to children younger than 1 year old.  · Use a cool-mist humidifier in your childs bedroom to make breathing easier, especially at night. Clean and dry the  humidifier to keep bacteria and mold from growing. Dont use using a hot water vaporizer. It can cause burns.  · Dont smoke around your child. Tobacco smoke can make your childs symptoms worse.  Follow-up care  Follow up with your childs healthcare provider, or as directed.  When to seek medical advice  Unless advised otherwise, call your child's healthcare provider if:  · Your child is 3 months old or younger and has a fever of 100.4°F (38°C) or higher. Your child may need to see a healthcare provider.  · Your child is of any age and has fevers higher than 104°F (40°C) that come back again and again.  Call your child's provider right away if your child has any of these:  · Swelling or redness around eyes that lasts all day, not just in the morning  · Vomiting that continues  · Sensitivity to light  · Irritability that gets worse  · Sudden or severe pain in face or head  · Double vision  · Not acting right or thinking clearly  · Stiff neck  · Breathing problems  · Symptoms not going away in 10 days  Date Last Reviewed: 4/13/2015 © 2000-2017 [employer]. [address] [place] [address], [place] [address]. All rights reserved. This information is not intended as a substitute for professional medical care. Always follow your healthcare professional's instructions.

## 2019-08-13 NOTE — PROGRESS NOTES
Subjective:       Patient ID: Jorge Luis Gilmore is a 29 y.o. male.    Chief Complaint: Follow-up (1 month back) and Sinus Problem    Mr. Gilmore is here today for one month follow up after a lumbar strain. He was prescribed Flexeril and Mobic for symptoms relief. Symptoms have completely resolved at this time. His pain is a 0/10. Discussed continuing proper body mechanics. Also has acute complaint of sinusitis.     Sinus Problem   This is a recurrent problem. The current episode started in the past 7 days (Saturday). The problem has been gradually worsening since onset. There has been no fever. His pain is at a severity of 0/10. He is experiencing no pain. Associated symptoms include congestion, ear pain (left), headaches, sinus pressure and sneezing. Pertinent negatives include no chills, coughing, diaphoresis, hoarse voice, neck pain, shortness of breath or sore throat. Past treatments include oral decongestants (Advil cold, claritin, trevor seltzer). The treatment provided no relief.       Patient Active Problem List   Diagnosis    Lumbar strain       Family History   Problem Relation Age of Onset    No Known Problems Mother     No Known Problems Father     Heart disease Maternal Grandmother     Diabetes Maternal Grandmother     Heart disease Maternal Grandfather     Prostate cancer Paternal Grandfather      History reviewed. No pertinent surgical history.      Current Outpatient Medications:     fluticasone propionate (FLONASE) 50 mcg/actuation nasal spray, 1 spray (50 mcg total) by Each Nostril route once daily., Disp: 16 g, Rfl: 1    methylPREDNISolone (MEDROL DOSEPACK) 4 mg tablet, use as directed, Disp: 21 tablet, Rfl: 0    Review of Systems   Constitutional: Negative for activity change, appetite change, chills, diaphoresis, fatigue and fever.   HENT: Positive for congestion, ear pain (left), postnasal drip, rhinorrhea, sinus pressure, sinus pain and sneezing. Negative for hearing loss, hoarse  "voice, sore throat, tinnitus, trouble swallowing and voice change.    Respiratory: Negative for apnea, cough, chest tightness, shortness of breath and wheezing.    Cardiovascular: Negative for chest pain, palpitations and leg swelling.   Gastrointestinal: Negative for abdominal pain, constipation, diarrhea, nausea and vomiting.   Musculoskeletal: Negative for arthralgias, back pain, gait problem, myalgias, neck pain and neck stiffness.   Skin: Negative for color change, pallor and rash.   Neurological: Positive for headaches. Negative for dizziness, syncope, weakness, light-headedness and numbness.   Psychiatric/Behavioral: Negative for agitation, behavioral problems and confusion.       Objective:   /63 (BP Location: Left arm, Patient Position: Sitting, BP Method: Large (Automatic))   Pulse 68   Temp 98 °F (36.7 °C) (Tympanic)   Resp 18   Ht 5' 11" (1.803 m)   Wt 124.6 kg (274 lb 11.1 oz)   SpO2 97%   BMI 38.31 kg/m²      Physical Exam   Constitutional: He is oriented to person, place, and time. He appears well-developed and well-nourished. He is cooperative. He does not appear ill. No distress.   HENT:   Head: Normocephalic and atraumatic.   Right Ear: External ear and ear canal normal. No tenderness. Tympanic membrane is not injected and not erythematous. A middle ear effusion is present.   Left Ear: External ear and ear canal normal. No tenderness. Tympanic membrane is not injected and not erythematous. A middle ear effusion is present.   Nose: Mucosal edema and rhinorrhea present. No sinus tenderness. Right sinus exhibits no maxillary sinus tenderness and no frontal sinus tenderness. Left sinus exhibits no maxillary sinus tenderness and no frontal sinus tenderness.   Mouth/Throat: Uvula is midline and mucous membranes are normal. Oropharyngeal exudate present. No posterior oropharyngeal edema or posterior oropharyngeal erythema.   Eyes: Pupils are equal, round, and reactive to light. Conjunctivae " and EOM are normal. Right eye exhibits no discharge. Left eye exhibits no discharge.   Neck: Normal range of motion. Neck supple.   Cardiovascular: Normal rate, regular rhythm, normal heart sounds and intact distal pulses. Exam reveals no gallop and no friction rub.   No murmur heard.  Pulmonary/Chest: Effort normal and breath sounds normal. No respiratory distress. He has no wheezes. He has no rales.   Abdominal: Soft. Bowel sounds are normal. There is no tenderness.   Musculoskeletal: Normal range of motion. He exhibits no edema, tenderness or deformity.   Lymphadenopathy:     He has no cervical adenopathy.   Neurological: He is alert and oriented to person, place, and time. No cranial nerve deficit.   Skin: Skin is warm and dry. Capillary refill takes less than 2 seconds. No rash noted. He is not diaphoretic. No erythema. No pallor.   Psychiatric: He has a normal mood and affect. His behavior is normal.   Vitals reviewed.      Assessment & Plan     Problem List Items Addressed This Visit        Orthopedic    Lumbar strain    Current Assessment & Plan     Symptoms have resolved. He has not had to use flexeril or mobic for pain relief. Continue back stretcher and using proper body mechanics.            Other Visit Diagnoses     Viral URI    -  Primary    Relevant Medications    fluticasone propionate (FLONASE) 50 mcg/actuation nasal spray    Acute recurrent sinusitis, unspecified location        Relevant Medications    fluticasone propionate (FLONASE) 50 mcg/actuation nasal spray    methylPREDNISolone (MEDROL DOSEPACK) 4 mg tablet           Follow up if symptoms worsen or fail to improve.

## 2019-08-18 NOTE — ASSESSMENT & PLAN NOTE
Symptoms have resolved. He has not had to use flexeril or mobic for pain relief. Continue back stretcher and using proper body mechanics.

## 2020-03-18 ENCOUNTER — OFFICE VISIT (OUTPATIENT)
Dept: INTERNAL MEDICINE | Facility: CLINIC | Age: 30
End: 2020-03-18
Payer: COMMERCIAL

## 2020-03-18 VITALS
DIASTOLIC BLOOD PRESSURE: 61 MMHG | HEIGHT: 71 IN | SYSTOLIC BLOOD PRESSURE: 124 MMHG | TEMPERATURE: 97 F | BODY MASS INDEX: 37.26 KG/M2 | RESPIRATION RATE: 18 BRPM | WEIGHT: 266.13 LBS | HEART RATE: 91 BPM

## 2020-03-18 DIAGNOSIS — R50.9 FEVER, UNSPECIFIED FEVER CAUSE: ICD-10-CM

## 2020-03-18 DIAGNOSIS — J32.9 CHRONIC SINUSITIS, UNSPECIFIED LOCATION: Primary | ICD-10-CM

## 2020-03-18 DIAGNOSIS — R68.89 FLU-LIKE SYMPTOMS: ICD-10-CM

## 2020-03-18 LAB
CTP QC/QA: YES
POC MOLECULAR INFLUENZA A AGN: NEGATIVE
POC MOLECULAR INFLUENZA B AGN: NEGATIVE

## 2020-03-18 PROCEDURE — 3008F PR BODY MASS INDEX (BMI) DOCUMENTED: ICD-10-PCS | Mod: CPTII,S$GLB,, | Performed by: NURSE PRACTITIONER

## 2020-03-18 PROCEDURE — 87502 INFLUENZA DNA AMP PROBE: CPT | Mod: QW,S$GLB,, | Performed by: NURSE PRACTITIONER

## 2020-03-18 PROCEDURE — 3008F BODY MASS INDEX DOCD: CPT | Mod: CPTII,S$GLB,, | Performed by: NURSE PRACTITIONER

## 2020-03-18 PROCEDURE — 99214 OFFICE O/P EST MOD 30 MIN: CPT | Mod: 25,S$GLB,, | Performed by: NURSE PRACTITIONER

## 2020-03-18 PROCEDURE — 87502 POCT INFLUENZA A/B MOLECULAR: ICD-10-PCS | Mod: QW,S$GLB,, | Performed by: NURSE PRACTITIONER

## 2020-03-18 PROCEDURE — 99999 PR PBB SHADOW E&M-EST. PATIENT-LVL III: ICD-10-PCS | Mod: PBBFAC,,, | Performed by: NURSE PRACTITIONER

## 2020-03-18 PROCEDURE — 99999 PR PBB SHADOW E&M-EST. PATIENT-LVL III: CPT | Mod: PBBFAC,,, | Performed by: NURSE PRACTITIONER

## 2020-03-18 PROCEDURE — 99214 PR OFFICE/OUTPT VISIT, EST, LEVL IV, 30-39 MIN: ICD-10-PCS | Mod: 25,S$GLB,, | Performed by: NURSE PRACTITIONER

## 2020-03-18 RX ORDER — FLUTICASONE PROPIONATE 50 MCG
2 SPRAY, SUSPENSION (ML) NASAL DAILY
Qty: 16 G | Refills: 5 | Status: SHIPPED | OUTPATIENT
Start: 2020-03-18 | End: 2020-08-03

## 2020-03-18 RX ORDER — CETIRIZINE HYDROCHLORIDE 10 MG/1
10 TABLET ORAL DAILY
COMMUNITY
Start: 2020-03-18 | End: 2020-05-26

## 2020-03-18 NOTE — LETTER
March 18, 2020      [facility]  [address]  [place] [address]  Phone: [phone]  Fax: [phone]       Patient: Jorge Luis Gilmore   YOB: 1990  Date of Visit: 03/18/2020    To Whom It May Concern:    Tereza Gilmore  was at [facility] on 03/18/2020. He may return to work/school on 3/23/2020 with no restrictions. If you have any questions or concerns, or if I can be of further assistance, please do not hesitate to contact me.    Sincerely,    Raine Amanda NP

## 2020-03-18 NOTE — PROGRESS NOTES
Subjective:       Patient ID: Jorge Luis Gilmore is a 30 y.o. male.    Chief Complaint: Cough and Headache    Mr. Gilmore is here today complaining of nasal congestion, coughing, and fever.  He reports that his symptoms started early this morning.  He reports that he woke up at 1:00 a.m. with a fever of 101.  He did not take anything for his fever, and is afebrile at this time.  He reports that his daughter was diagnosed with the flu yesterday.  He does admit that he suffers with chronic sinusitis, and has not been taking anything for this also.    Fever    This is a new problem. The current episode started today. The problem occurs constantly. The problem has been rapidly worsening. The maximum temperature noted was 101 to 101.9 F. Associated symptoms include congestion, coughing, ear pain, headaches, muscle aches, sleepiness and a sore throat. Pertinent negatives include no abdominal pain, chest pain, diarrhea, nausea, urinary pain, vomiting or wheezing. He has tried nothing for the symptoms.   Risk factors: sick contacts    Risk factors: no recent travel    Risk factors comment:  Daughter has flu      Patient Active Problem List   Diagnosis    Lumbar strain    Flu-like symptoms    Chronic sinusitis       Family History   Problem Relation Age of Onset    No Known Problems Mother     No Known Problems Father     Heart disease Maternal Grandmother     Diabetes Maternal Grandmother     Heart disease Maternal Grandfather     Prostate cancer Paternal Grandfather      History reviewed. No pertinent surgical history.      Current Outpatient Medications:     cetirizine (ZYRTEC) 10 MG tablet, Take 1 tablet (10 mg total) by mouth once daily., Disp: , Rfl:     fluticasone propionate (FLONASE) 50 mcg/actuation nasal spray, 2 sprays (100 mcg total) by Each Nostril route once daily., Disp: 16 g, Rfl: 5    Review of Systems   Constitutional: Positive for chills, fatigue and fever.   HENT: Positive for congestion,  "ear pain, postnasal drip, rhinorrhea, sinus pressure and sore throat. Negative for sinus pain and sneezing.    Eyes: Negative for photophobia and visual disturbance.   Respiratory: Positive for cough. Negative for chest tightness, shortness of breath and wheezing.    Cardiovascular: Negative for chest pain and palpitations.   Gastrointestinal: Negative for abdominal pain, diarrhea, nausea and vomiting.   Genitourinary: Negative for dysuria, flank pain, frequency, hematuria and urgency.   Musculoskeletal: Positive for back pain and myalgias.   Neurological: Positive for headaches. Negative for dizziness, light-headedness and numbness.       Objective:   /61   Pulse 91   Temp 96.9 °F (36.1 °C) (Tympanic)   Resp 18   Ht 5' 11" (1.803 m)   Wt 120.7 kg (266 lb 1.5 oz)   BMI 37.11 kg/m²      Physical Exam   Constitutional: He is oriented to person, place, and time. He appears well-developed and well-nourished. He is cooperative. He appears ill (mild). No distress.   HENT:   Head: Normocephalic and atraumatic.   Right Ear: No tenderness. Tympanic membrane is not erythematous and not bulging. A middle ear effusion is present.   Left Ear: No tenderness. Tympanic membrane is not erythematous and not bulging. A middle ear effusion is present.   Nose: Mucosal edema and rhinorrhea present. Right sinus exhibits no maxillary sinus tenderness and no frontal sinus tenderness. Left sinus exhibits no maxillary sinus tenderness and no frontal sinus tenderness.   Mouth/Throat: Uvula is midline, oropharynx is clear and moist and mucous membranes are normal. No oropharyngeal exudate or posterior oropharyngeal erythema.   Eyes: Pupils are equal, round, and reactive to light. Conjunctivae and EOM are normal. Right eye exhibits no discharge. Left eye exhibits no discharge.   Neck: Normal range of motion. Neck supple.   Cardiovascular: Normal rate, regular rhythm, normal heart sounds and intact distal pulses. Exam reveals no " gallop and no friction rub.   No murmur heard.  Pulmonary/Chest: Effort normal and breath sounds normal. No respiratory distress. He has no wheezes.   Musculoskeletal: Normal range of motion. He exhibits no edema.   Lymphadenopathy:     He has no cervical adenopathy.   Neurological: He is alert and oriented to person, place, and time. No cranial nerve deficit.   Skin: Skin is warm and dry. He is not diaphoretic. No erythema.       Assessment & Plan     Problem List Items Addressed This Visit        ENT    Chronic sinusitis - Primary    Relevant Medications    fluticasone propionate (FLONASE) 50 mcg/actuation nasal spray    cetirizine (ZYRTEC) 10 MG tablet       Other    Flu-like symptoms    Current Assessment & Plan     Negative for fever at this time even without previous treatment.  Symptoms most likely related to viral URI, or chronic sinusitis.  Tylenol/ibuprofen for pain and fever.   Hand hygiene.   Maintain adequate hydration.   Antihistamine and flonase for nasal congestion and upper respiratory symptoms.   Rest.   Discussed COVID-19 s/s and the need for self-quarantine if pt should start with these symptoms. Discussed appropriate home prevention measures. CDC handout given.            Other Visit Diagnoses     Fever, unspecified fever cause        Relevant Orders    POCT Influenza A/B Molecular (Completed)           Follow up if symptoms worsen or fail to improve.

## 2020-03-18 NOTE — ASSESSMENT & PLAN NOTE
Negative for fever at this time even without previous treatment.  Symptoms most likely related to viral URI, or chronic sinusitis.  Tylenol/ibuprofen for pain and fever.   Hand hygiene.   Maintain adequate hydration.   Antihistamine and flonase for nasal congestion and upper respiratory symptoms.   Rest.   Discussed COVID-19 s/s and the need for self-quarantine if pt should start with these symptoms. Discussed appropriate home prevention measures. CDC handout given.

## 2020-03-18 NOTE — PATIENT INSTRUCTIONS
You have NOT been diagnosed with COVID-19 coronavirus disease.         You have NOT been diagnosed with COVID-19 coronavirus disease.         You have NOT been diagnosed with COVID-19 coronavirus disease.         You have NOT been diagnosed with COVID-19 coronavirus disease.

## 2020-03-19 ENCOUNTER — PATIENT MESSAGE (OUTPATIENT)
Dept: INTERNAL MEDICINE | Facility: CLINIC | Age: 30
End: 2020-03-19

## 2020-03-19 ENCOUNTER — TELEPHONE (OUTPATIENT)
Dept: INTERNAL MEDICINE | Facility: CLINIC | Age: 30
End: 2020-03-19

## 2020-03-19 DIAGNOSIS — R50.9 FEVER, UNSPECIFIED FEVER CAUSE: Primary | ICD-10-CM

## 2020-03-19 NOTE — TELEPHONE ENCOUNTER
Per. Dr. Rees he cannot get another flu test he needs to go to  and get the coronavirus test tomorrow; the hours are between 1-3 Fc,lpn. He already tested negative yesturday, and a new test for Flu is not indicated.

## 2020-03-19 NOTE — TELEPHONE ENCOUNTER
Patient was seen in the clinic on yesterday by Raine. Patient was test for the flu, which was negative. Today patient is concern that the fever(101.7) is back and has a cough.     Please advise

## 2020-03-19 NOTE — TELEPHONE ENCOUNTER
----- Message from Myriam Farias sent at 3/19/2020  3:57 PM CDT -----  Contact: self [phone]  Type:  Patient Returning Call    Who Called:Danny Gilmore  Who Left Message for Patient: unk  Does the patient know what this is regarding?:Covid 19  Would the patient rather a call back or a response via [facility]?  Call back   Best Call Back Number:[phone]  Additional Information:

## 2020-03-19 NOTE — TELEPHONE ENCOUNTER
Patient was notified that we are sending him to get test for covid and will need to be self isolation until result are back.

## 2020-03-20 ENCOUNTER — PATIENT MESSAGE (OUTPATIENT)
Dept: INTERNAL MEDICINE | Facility: CLINIC | Age: 30
End: 2020-03-20

## 2020-03-20 ENCOUNTER — TELEPHONE (OUTPATIENT)
Dept: INTERNAL MEDICINE | Facility: CLINIC | Age: 30
End: 2020-03-20

## 2020-03-20 NOTE — TELEPHONE ENCOUNTER
----- Message from Gale Marion sent at 3/20/2020  1:35 PM CDT -----  Contact: [phone]  Would like to consult with the nurse, patient was seen in the office  And would like to speak with the nurse concerning an Dr Excise please call back at [phone], thanks sj

## 2020-03-20 NOTE — TELEPHONE ENCOUNTER
----- Message from Christopher Sanders sent at 3/20/2020  3:54 PM CDT -----  Contact: pt   Pt checking on dr hsieh for 3/30 and 3/31       [phone]

## 2020-03-20 NOTE — LETTER
Pt Name: Jorge Luis Gilmore  YOB: 1990     To Whom It May Concern:     Jorge Luis Gilmore was in contact with/seen in my office on 03/20/2020. COVID-19 is present in our communities across the state. There is limited testing for COVID at this time, so not all patients can be tested. In this situation, your employee meets the following criteria:     Return in 14 days pending viral results.     If you have any questions or concerns, or if I can be of further assistance, please do not hesitate to contact me.     Sincerely,    Provider Signature/Printed Name:Zee Rees MD Date:03/20/2020

## 2020-03-23 ENCOUNTER — PATIENT MESSAGE (OUTPATIENT)
Dept: INTERNAL MEDICINE | Facility: CLINIC | Age: 30
End: 2020-03-23

## 2020-03-29 ENCOUNTER — PATIENT MESSAGE (OUTPATIENT)
Dept: INTERNAL MEDICINE | Facility: CLINIC | Age: 30
End: 2020-03-29

## 2020-03-29 NOTE — LETTER
April 6, 2020    Jorge Luis Gilmore  [address]  [address]  [place] [address]         [facility]  [address]  [place] [address]  Phone: [phone]  Fax: [phone] April 6, 2020     Patient: Jorge Luis Gilmore   YOB: 1990   Date of Visit: 3/29/2020       To Whom It May Concern:    It is my medical opinion that Jorge Luis Gilmore may return to full duty immediately with no restrictions. Symptom free for more than 7 days and per CDC guidelines this is sufficient for him to safely return to work    If you have any questions or concerns, please don't hesitate to call.    Sincerely,        Raine Amanda NP

## 2020-03-30 NOTE — TELEPHONE ENCOUNTER
Ok to provide note saying that he is symptom free and may return to work as the 14 day period has passed. Test results never came in.

## 2020-03-31 ENCOUNTER — DOCUMENTATION ONLY (OUTPATIENT)
Dept: INTERNAL MEDICINE | Facility: CLINIC | Age: 30
End: 2020-03-31

## 2020-03-31 NOTE — LETTER
[address] ? [place], [address] ? Phone [phone] ? Fax [phone] ? [email or web address]          Return to Work/School    Patient: Jorge Luis Gilmore  YOB: 1990   Date: 03/31/2020      To Whom It May Concern:     Jorge Luis Gilmore Covid 19 result was NEGATIVE. The employee can return to work.     If you have any questions or concerns, or if I can be of further assistance, please do not hesitate to contact me.     Sincerely,    Zee Rees MD

## 2020-04-06 NOTE — TELEPHONE ENCOUNTER
Hello can you give me something for a sinus infection nothing over the counter is working. I have sinus pressure on the left side of my face         Please advise?

## 2020-04-07 ENCOUNTER — OFFICE VISIT (OUTPATIENT)
Dept: INTERNAL MEDICINE | Facility: CLINIC | Age: 30
End: 2020-04-07
Payer: COMMERCIAL

## 2020-04-07 DIAGNOSIS — J01.01 ACUTE RECURRENT MAXILLARY SINUSITIS: ICD-10-CM

## 2020-04-07 PROBLEM — R68.89 FLU-LIKE SYMPTOMS: Status: RESOLVED | Noted: 2020-03-18 | Resolved: 2020-04-07

## 2020-04-07 PROCEDURE — 99214 OFFICE O/P EST MOD 30 MIN: CPT | Mod: 95,,, | Performed by: FAMILY MEDICINE

## 2020-04-07 PROCEDURE — 99214 PR OFFICE/OUTPT VISIT, EST, LEVL IV, 30-39 MIN: ICD-10-PCS | Mod: 95,,, | Performed by: FAMILY MEDICINE

## 2020-04-07 RX ORDER — CEFDINIR 300 MG/1
300 CAPSULE ORAL EVERY 12 HOURS
Qty: 14 CAPSULE | Refills: 0 | Status: SHIPPED | OUTPATIENT
Start: 2020-04-07 | End: 2020-04-14

## 2020-04-07 NOTE — TELEPHONE ENCOUNTER
Pt c/o sinus pressure. Has tried Zyrtec D , Tylenol sinus , trevor-seltzer plus, flonase nasal spray.. Not helping. Wants to know if he can get something called in. I see this has been going on a while. Do you want him to do video visit?

## 2020-04-07 NOTE — PROGRESS NOTES
Subjective:       Patient ID: Jorge Luis Gilmore is a 30 y.o. male.    Chief Complaint: Sinus Problem  The patient location is: [place]  The chief complaint leading to consultation is: sinus pain   Visit type: Virtual visit with synchronous audio and video  Total time spent with patient: 5 minutes  Each patient to whom he or she provides medical services by telemedicine is:  (1) informed of the relationship between the physician and patient and the respective role of any other health care provider with respect to management of the patient; and (2) notified that he or she may decline to receive medical services by telemedicine and may withdraw from such care at any time.      Sinus Problem   This is a new problem. The current episode started in the past 7 days. The problem has been gradually worsening since onset. There has been no fever. The pain is mild. Associated symptoms include congestion, headaches, sinus pressure and a sore throat. Pertinent negatives include no coughing, ear pain, neck pain, shortness of breath or swollen glands. Past treatments include oral decongestants and spray decongestants. The treatment provided mild relief.       Family History   Problem Relation Age of Onset    No Known Problems Mother     No Known Problems Father     Heart disease Maternal Grandmother     Diabetes Maternal Grandmother     Heart disease Maternal Grandfather     Prostate cancer Paternal Grandfather        Current Outpatient Medications:     cefdinir (OMNICEF) 300 MG capsule, Take 1 capsule (300 mg total) by mouth every 12 (twelve) hours. for 7 days, Disp: 14 capsule, Rfl: 0    cetirizine (ZYRTEC) 10 MG tablet, Take 1 tablet (10 mg total) by mouth once daily., Disp: , Rfl:     fluticasone propionate (FLONASE) 50 mcg/actuation nasal spray, 2 sprays (100 mcg total) by Each Nostril route once daily., Disp: 16 g, Rfl: 5    Review of Systems   HENT: Positive for congestion, sinus pressure and sore throat. Negative  for ear pain.    Respiratory: Negative for cough and shortness of breath.    Musculoskeletal: Negative for neck pain.   Neurological: Positive for headaches.       Objective:   There were no vitals taken for this visit.     Physical Exam   Constitutional: He is oriented to person, place, and time. He appears well-developed and well-nourished.   HENT:   Head: Normocephalic and atraumatic.   Nose: Left sinus exhibits maxillary sinus tenderness.   Pulmonary/Chest: No respiratory distress.   Neurological: He is alert and oriented to person, place, and time.   Psychiatric: He has a normal mood and affect. His behavior is normal.       Assessment & Plan     Problem List Items Addressed This Visit        ENT    Acute recurrent maxillary sinusitis    Current Assessment & Plan     Treating with cefdinir. Unilateral discomfort and allergy to amoxicillin                 No follow-ups on file.    Disclaimer:  This note may have been prepared using voice recognition software, it may have not been extensively proofed, as such there could be errors within the text such as sound alike errors.

## 2020-05-26 ENCOUNTER — OFFICE VISIT (OUTPATIENT)
Dept: INTERNAL MEDICINE | Facility: CLINIC | Age: 30
End: 2020-05-26
Payer: COMMERCIAL

## 2020-05-26 VITALS
HEART RATE: 77 BPM | DIASTOLIC BLOOD PRESSURE: 62 MMHG | WEIGHT: 268.31 LBS | OXYGEN SATURATION: 97 % | RESPIRATION RATE: 16 BRPM | SYSTOLIC BLOOD PRESSURE: 133 MMHG | HEIGHT: 71 IN | BODY MASS INDEX: 37.56 KG/M2

## 2020-05-26 DIAGNOSIS — J32.4 CHRONIC PANSINUSITIS: Primary | ICD-10-CM

## 2020-05-26 DIAGNOSIS — J30.9 ALLERGIC RHINITIS, UNSPECIFIED SEASONALITY, UNSPECIFIED TRIGGER: ICD-10-CM

## 2020-05-26 PROCEDURE — 96372 PR INJECTION,THERAP/PROPH/DIAG2ST, IM OR SUBCUT: ICD-10-PCS | Mod: S$GLB,,, | Performed by: NURSE PRACTITIONER

## 2020-05-26 PROCEDURE — 99213 OFFICE O/P EST LOW 20 MIN: CPT | Mod: 25,S$GLB,, | Performed by: NURSE PRACTITIONER

## 2020-05-26 PROCEDURE — 99999 PR PBB SHADOW E&M-EST. PATIENT-LVL III: ICD-10-PCS | Mod: PBBFAC,,, | Performed by: NURSE PRACTITIONER

## 2020-05-26 PROCEDURE — 3008F PR BODY MASS INDEX (BMI) DOCUMENTED: ICD-10-PCS | Mod: CPTII,S$GLB,, | Performed by: NURSE PRACTITIONER

## 2020-05-26 PROCEDURE — 3008F BODY MASS INDEX DOCD: CPT | Mod: CPTII,S$GLB,, | Performed by: NURSE PRACTITIONER

## 2020-05-26 PROCEDURE — 99999 PR PBB SHADOW E&M-EST. PATIENT-LVL III: CPT | Mod: PBBFAC,,, | Performed by: NURSE PRACTITIONER

## 2020-05-26 PROCEDURE — 99213 PR OFFICE/OUTPT VISIT, EST, LEVL III, 20-29 MIN: ICD-10-PCS | Mod: 25,S$GLB,, | Performed by: NURSE PRACTITIONER

## 2020-05-26 PROCEDURE — 96372 THER/PROPH/DIAG INJ SC/IM: CPT | Mod: S$GLB,,, | Performed by: NURSE PRACTITIONER

## 2020-05-26 RX ORDER — LEVOCETIRIZINE DIHYDROCHLORIDE 5 MG/1
5 TABLET, FILM COATED ORAL NIGHTLY
Qty: 30 TABLET | Refills: 5 | Status: SHIPPED | OUTPATIENT
Start: 2020-05-26 | End: 2021-04-15

## 2020-05-26 RX ORDER — BETAMETHASONE SODIUM PHOSPHATE AND BETAMETHASONE ACETATE 3; 3 MG/ML; MG/ML
9 INJECTION, SUSPENSION INTRA-ARTICULAR; INTRALESIONAL; INTRAMUSCULAR; SOFT TISSUE
Status: COMPLETED | OUTPATIENT
Start: 2020-05-26 | End: 2020-05-26

## 2020-05-26 RX ADMIN — BETAMETHASONE SODIUM PHOSPHATE AND BETAMETHASONE ACETATE 9 MG: 3; 3 INJECTION, SUSPENSION INTRA-ARTICULAR; INTRALESIONAL; INTRAMUSCULAR; SOFT TISSUE at 01:05

## 2020-05-26 NOTE — PROGRESS NOTES
Subjective:       Patient ID: Jorge Luis Gilmore is a 30 y.o. male.    Chief Complaint: Sinus Problem    Mr. Gilmore presents to clinic with c/o recurrent sinusitis. He was previously treated for sinusitis with cefdinir and has not had any relief. He continues to use flonase, but has not had relief with zyrtec, claritin, allegra, or mucinex D. He has seen ENT in 2016, where he had a normal CT scan, and was referred to allergist, but he never was able to go. This seems like a seasonal and recurrent thing per pt. Denies any fever.     Sinus Problem   This is a recurrent problem. The current episode started more than 1 month ago. The problem is unchanged. There has been no fever. His pain is at a severity of 2/10. Associated symptoms include congestion and sinus pressure. Pertinent negatives include no chills, coughing, diaphoresis, ear pain, headaches, neck pain, shortness of breath, sneezing, sore throat or swollen glands. Past treatments include antibiotics (flonase, cefdinir). The treatment provided no relief.       Patient Active Problem List   Diagnosis    Lumbar strain    Acute recurrent maxillary sinusitis    Chronic pansinusitis       Family History   Problem Relation Age of Onset    No Known Problems Mother     No Known Problems Father     Heart disease Maternal Grandmother     Diabetes Maternal Grandmother     Heart disease Maternal Grandfather     Prostate cancer Paternal Grandfather      History reviewed. No pertinent surgical history.      Current Outpatient Medications:     fluticasone propionate (FLONASE) 50 mcg/actuation nasal spray, 2 sprays (100 mcg total) by Each Nostril route once daily., Disp: 16 g, Rfl: 5    levocetirizine (XYZAL) 5 MG tablet, Take 1 tablet (5 mg total) by mouth every evening., Disp: 30 tablet, Rfl: 5  No current facility-administered medications for this visit.     Review of Systems   Constitutional: Negative for chills, diaphoresis, fatigue and fever.   HENT:  "Positive for congestion, postnasal drip, rhinorrhea and sinus pressure. Negative for ear pain, sneezing and sore throat.    Respiratory: Negative for cough, chest tightness and shortness of breath.    Cardiovascular: Negative for chest pain and palpitations.   Gastrointestinal: Negative for nausea and vomiting.   Musculoskeletal: Negative for neck pain.   Neurological: Negative for dizziness, light-headedness and headaches.       Objective:   /62 (BP Location: Left arm, Patient Position: Sitting, BP Method: Large (Automatic))   Pulse 77   Resp 16   Ht 5' 11" (1.803 m)   Wt 121.7 kg (268 lb 4.8 oz)   SpO2 97%   BMI 37.42 kg/m²      Physical Exam   Constitutional: He is oriented to person, place, and time. He appears well-developed and well-nourished. He is cooperative. He does not appear ill. No distress.   HENT:   Head: Normocephalic and atraumatic.   Right Ear: Ear canal normal. Tympanic membrane is not erythematous. A middle ear effusion is present.   Left Ear: Ear canal normal. Tympanic membrane is not erythematous. A middle ear effusion is present.   Nose: Mucosal edema and rhinorrhea present. Right sinus exhibits no maxillary sinus tenderness and no frontal sinus tenderness. Left sinus exhibits frontal sinus tenderness. Left sinus exhibits no maxillary sinus tenderness.   Mouth/Throat: Uvula is midline, oropharynx is clear and moist and mucous membranes are normal. No oropharyngeal exudate, posterior oropharyngeal edema or posterior oropharyngeal erythema. No tonsillar exudate.   Eyes: Pupils are equal, round, and reactive to light. Conjunctivae and EOM are normal. Right eye exhibits no discharge. Left eye exhibits no discharge.   Neck: Normal range of motion. Neck supple.   Cardiovascular: Normal rate, regular rhythm, normal heart sounds and intact distal pulses. Exam reveals no gallop and no friction rub.   No murmur heard.  Pulmonary/Chest: Effort normal and breath sounds normal. No respiratory " distress. He has no wheezes.   Musculoskeletal: Normal range of motion. He exhibits no edema.   Lymphadenopathy:     He has no cervical adenopathy.   Neurological: He is alert and oriented to person, place, and time. No cranial nerve deficit.   Skin: Skin is warm and dry. He is not diaphoretic. No erythema.   Psychiatric: He has a normal mood and affect. His behavior is normal.   Vitals reviewed.      Assessment & Plan     Problem List Items Addressed This Visit        ENT    Chronic pansinusitis - Primary    Current Assessment & Plan     Was going to refer pt to ENT, but see where pt previously saw Dr. Jacobs in 2016, and had negative CT scan and was referred to allergist. Pt was never able to see allergist so will send another referral for pt, and also changing antihistamine to xyzal to see if this helps with symptom relief. No fever, and with symptoms being as recurrent as they are, I believe there is no need for abx at this time.          Relevant Medications    levocetirizine (XYZAL) 5 MG tablet    betamethasone acetate-betamethasone sodium phosphate injection 9 mg (Completed)      Other Visit Diagnoses     Allergic rhinitis, unspecified seasonality, unspecified trigger        Relevant Orders    Ambulatory referral/consult to Allergy        Follow up if symptoms worsen or fail to improve.

## 2020-05-26 NOTE — ASSESSMENT & PLAN NOTE
Was going to refer pt to ENT, but see where pt previously saw Dr. Jacobs in 2016, and had negative CT scan and was referred to allergist. Pt was never able to see allergist so will send another referral for pt, and also changing antihistamine to xyzal to see if this helps with symptom relief. No fever, and with symptoms being as recurrent as they are, I believe there is no need for abx at this time.

## 2020-05-26 NOTE — PATIENT INSTRUCTIONS
Allergic Rhinitis  Allergic rhinitis is an allergic reaction that affects the nose, and often the eyes. Its often known as nasal allergies. Nasal allergies are often due to things in the environment that are breathed in. Depending what you are sensitive to, nasal allergies may occur only during certain seasons. Or they may occur year round. Common indoor allergens include house dust mites, mold, cockroaches, and pet dander. Outdoor allergens include pollen from trees, grasses, and weeds.   Symptoms include a drippy, stuffy, and itchy nose. They also include sneezing and red and itchy eyes. You may feel tired more often. Severe allergies may also affect your breathing and trigger a condition called asthma.   Tests can be done to see what allergens are affecting you. You may be referred to an allergy specialist for testing and further evaluation.  Home care  Your healthcare provider may prescribe medicines to help relieve allergy symptoms. These may include oral medicines, nasal sprays, or eye drops.  Ask your provider for advice on how to avoid substances that you are allergic to. Below are a few tips for each type of allergen.  Pet dander:  · Do not have pets with fur and feathers.  · If you can't avoid having a pet, keep it out of your bedroom and off upholstered furniture.  Pollen:  · When pollen counts are high, keep windows of your car and home closed. If possible, use an air conditioner instead.  · Wear a filter mask when mowing or doing yard work.  House dust mites:  · Wash bedding every week in warm water and detergent and dry on a hot setting.  · Cover the mattress, box spring, and pillows with allergy covers.   · If possible, sleep in a room with no carpet, curtains, or upholstered furniture.  Cockroaches:  · Store food in sealed containers.  · Remove garbage from the home promptly.  · Fix water leaks  Mold:  · Keep humidity low by using a dehumidifier or air conditioner. Keep the dehumidifier and air  conditioner clean and free of mold.  · Clean moldy areas with bleach and water.  In general:  · Vacuum once or twice a week. If possible, use a vacuum with a high-efficiency particulate air (HEPA) filter.  · Do not smoke. Avoid cigarette smoke. Cigarette smoke is an irritant that can make symptoms worse.  Follow-up care  Follow up as advised by the healthcare provider or our staff. If you were referred to an allergy specialist, make this appointment promptly.  When to seek medical advice  Call your healthcare provider right away if the following occur:  · Coughing or wheezing  · Fever greater than 100.4°F (38°C)  · Hives (raised red bumps)  · Continuing symptoms, new symptoms, or worsening symptoms  Call 911 right away if you have:  · Trouble breathing  · Severe swelling of the face or severe itching of the eyes or mouth  Date Last Reviewed: 3/1/2017  © 2587-9393 [employer]. [address] [place] [address]. All rights reserved. This information is not intended as a substitute for professional medical care. Always follow your healthcare professional's instructions.        Sinusitis (No Antibiotics)    The sinuses are air-filled spaces within the bones of the face. They connect to the inside of the nose. Sinusitis is an inflammation of the tissue lining the sinus cavity. Sinus inflammation can occur during a cold. It can also be due to allergies to pollens and other particles in the air. It can cause symptoms such as sinus congestion, headache, sore throat, facial swelling and fullness. It may also cause a low-grade fever. No infection is present, and no antibiotic treatment is needed.  Home care  · Drink plenty of water, hot tea, and other liquids. This may help thin mucus. It also may promote sinus drainage.  · Heat may help soothe painful areas of the face. Use a towel soaked in hot water. Or,  the shower and direct the hot spray onto your face. Using a vaporizer along with a  menthol rub at night may also help.   · An expectorant containing guaifenesin may help thin the mucus and promote drainage from the sinuses.  · Over-the-counter decongestants may be used unless a similar medicine was prescribed. Nasal sprays work the fastest. Use one that contains phenylephrine or oxymetazoline. First blow the nose gently. Then use the spray. Do not use these medicines more often than directed on the label or symptoms may get worse. You may also use tablets containing pseudoephedrine. Avoid products that combine ingredients, because side effects may be increased. Read labels. You can also ask the pharmacist for help. (NOTE: Persons with high blood pressure should not use decongestants. They can raise blood pressure.)  · Over-the-counter antihistamines may help if allergies contributed to your sinusitis.    · Use acetaminophen or ibuprofen to control pain, unless another pain medicine was prescribed. (If you have chronic liver or kidney disease or ever had a stomach ulcer, talk with your doctor before using these medicines. Aspirin should never be used in anyone under 18 years of age who is ill with a fever. It may cause severe liver damage.)  · Use nasal rinses or irrigation as instructed by your health care provider.  · Don't smoke. This can worsen symptoms.  Follow-up care  Follow up with your healthcare provider or our staff if you are not improving within the next week.  When to seek medical advice  Call your healthcare provider if any of these occur:  · Green or yellow discharge from the nose or into the throat  · Facial pain or headache becoming more severe  · Stiff neck  · Unusual drowsiness or confusion  · Swelling of the forehead or eyelids  · Vision problems, including blurred or double vision  · Fever of 100.4ºF (38ºC) or higher, or as directed by your healthcare provider  · Seizure  · Breathing problems  · Symptoms not resolving within 10 days  Date Last Reviewed: 4/13/2015  © 1473-0210  [employer]. [address] [place] [address], [place] [address]. All rights reserved. This information is not intended as a substitute for professional medical care. Always follow your healthcare professional's instructions.

## 2020-06-03 ENCOUNTER — LAB VISIT (OUTPATIENT)
Dept: LAB | Facility: HOSPITAL | Age: 30
End: 2020-06-03
Attending: ALLERGY & IMMUNOLOGY
Payer: COMMERCIAL

## 2020-06-03 ENCOUNTER — OFFICE VISIT (OUTPATIENT)
Dept: ALLERGY | Facility: CLINIC | Age: 30
End: 2020-06-03
Payer: COMMERCIAL

## 2020-06-03 VITALS
BODY MASS INDEX: 37 KG/M2 | HEIGHT: 71 IN | TEMPERATURE: 98 F | SYSTOLIC BLOOD PRESSURE: 121 MMHG | HEART RATE: 74 BPM | WEIGHT: 264.31 LBS | DIASTOLIC BLOOD PRESSURE: 79 MMHG

## 2020-06-03 DIAGNOSIS — J30.9 ALLERGIC RHINITIS, UNSPECIFIED SEASONALITY, UNSPECIFIED TRIGGER: Primary | ICD-10-CM

## 2020-06-03 DIAGNOSIS — J30.9 ALLERGIC RHINITIS, UNSPECIFIED SEASONALITY, UNSPECIFIED TRIGGER: ICD-10-CM

## 2020-06-03 PROCEDURE — 99999 PR PBB SHADOW E&M-EST. PATIENT-LVL III: ICD-10-PCS | Mod: PBBFAC,,, | Performed by: ALLERGY & IMMUNOLOGY

## 2020-06-03 PROCEDURE — 86003 ALLG SPEC IGE CRUDE XTRC EA: CPT | Mod: 59

## 2020-06-03 PROCEDURE — 99204 PR OFFICE/OUTPT VISIT, NEW, LEVL IV, 45-59 MIN: ICD-10-PCS | Mod: S$GLB,,, | Performed by: ALLERGY & IMMUNOLOGY

## 2020-06-03 PROCEDURE — 36415 COLL VENOUS BLD VENIPUNCTURE: CPT

## 2020-06-03 PROCEDURE — 3008F BODY MASS INDEX DOCD: CPT | Mod: CPTII,S$GLB,, | Performed by: ALLERGY & IMMUNOLOGY

## 2020-06-03 PROCEDURE — 3008F PR BODY MASS INDEX (BMI) DOCUMENTED: ICD-10-PCS | Mod: CPTII,S$GLB,, | Performed by: ALLERGY & IMMUNOLOGY

## 2020-06-03 PROCEDURE — 82785 ASSAY OF IGE: CPT

## 2020-06-03 PROCEDURE — 99999 PR PBB SHADOW E&M-EST. PATIENT-LVL III: CPT | Mod: PBBFAC,,, | Performed by: ALLERGY & IMMUNOLOGY

## 2020-06-03 PROCEDURE — 86003 ALLG SPEC IGE CRUDE XTRC EA: CPT

## 2020-06-03 PROCEDURE — 99204 OFFICE O/P NEW MOD 45 MIN: CPT | Mod: S$GLB,,, | Performed by: ALLERGY & IMMUNOLOGY

## 2020-06-03 RX ORDER — AZELASTINE 1 MG/ML
1 SPRAY, METERED NASAL 2 TIMES DAILY
Qty: 20 ML | Refills: 5 | Status: SHIPPED | OUTPATIENT
Start: 2020-06-03 | End: 2021-04-15 | Stop reason: SDUPTHER

## 2020-06-03 NOTE — LETTER
Alissa 3, 2020      [place]  [address] [address] [address]           [facility] Allergy/ Immunology  [address] [facility] [address]  Phone: [phone]  Fax: [phone]          Patient: Jorge Luis Gilmore   MR Number: [id number]   YOB: 1990   Date of Visit: 6/3/2020       Dear Raine Amanda:    Thank you for referring Jorge Luis Gilmore to me for evaluation. Attached you will find relevant portions of my assessment and plan of care.    If you have questions, please do not hesitate to call me. I look forward to following Jorge Luis Gilmore along with you.    Sincerely,    Brandie Verdugo MD    Enclosure  CC:  No Recipients    If you would like to receive this communication electronically, please contact [email or web address] or [phone] to request more information on [employer] Link access.    For providers and/or their staff who would like to refer a patient to [facility], please contact us through our one-stop-shop provider referral line, [facility], at [phone].    If you feel you have received this communication in error or would no longer like to receive these types of communications, please e-mail [email or web address]

## 2020-06-03 NOTE — PROGRESS NOTES
"Subjective:       Patient ID: Jorge Luis Gilmore is a 30 y.o. male.    Chief Complaint:  Allergies      HPI:  30 year old male complaining of runny nose, watery eyes, itchy eyes, and nasal congestion. Symptoms are year round and worse in the Spring. He takes Xyzal and Flonase, which help "somewhat". He has lived here all of his life. He works in a chemical plant. He has not had allergy testing in the past. He has a history of asthma and last used albuterol 12 years ago. He denies any current symptoms. He reports eczema, but he feels that it is well controlled.    Past Medical History:   Diagnosis Date    Asthma     Delayed immunizations 4/2/2019    Paronychia of right thumb 5/23/2018    Routine general medical examination at a health care facility 4/15/2019    Viral upper respiratory tract infection 4/2/2019     Family History   Problem Relation Age of Onset    No Known Problems Mother     No Known Problems Father     Heart disease Maternal Grandmother     Diabetes Maternal Grandmother     Heart disease Maternal Grandfather     Prostate cancer Paternal Grandfather      Current Outpatient Medications on File Prior to Visit   Medication Sig Dispense Refill    fluticasone propionate (FLONASE) 50 mcg/actuation nasal spray 2 sprays (100 mcg total) by Each Nostril route once daily. 16 g 5    levocetirizine (XYZAL) 5 MG tablet Take 1 tablet (5 mg total) by mouth every evening. 30 tablet 5     No current facility-administered medications on file prior to visit.        Review of patient's allergies indicates:   Allergen Reactions    Amoxicillin Diarrhea         Environmental History:  No pets. He does not smoke.  Review of Systems   Constitutional: Negative for chills and fever.   HENT: Positive for congestion, rhinorrhea and sneezing.    Eyes: Positive for discharge and itching.   Respiratory: Negative for shortness of breath and wheezing.    Cardiovascular: Negative for chest pain and leg swelling. "   Gastrointestinal: Negative for nausea and vomiting.   Endocrine: Negative for cold intolerance and heat intolerance.   Genitourinary: Negative for dysuria and frequency.   Skin: Positive for rash. Negative for wound.   Allergic/Immunologic: Positive for environmental allergies. Negative for food allergies.   Neurological: Negative for dizziness and light-headedness.   Hematological: Negative for adenopathy. Does not bruise/bleed easily.   Psychiatric/Behavioral: Negative for agitation, confusion and suicidal ideas.        Objective:    Physical Exam   Constitutional: He is oriented to person, place, and time. He appears well-developed and well-nourished.   HENT:   Head: Normocephalic and atraumatic.   Right Ear: External ear normal.   Left Ear: External ear normal.   Nose: Nose normal.   Mouth/Throat: Oropharynx is clear and moist. No oropharyngeal exudate.   Eyes: Pupils are equal, round, and reactive to light. EOM are normal. Right eye exhibits no discharge. Left eye exhibits no discharge. No scleral icterus.   Neck: Normal range of motion. Neck supple. No tracheal deviation present. No thyromegaly present.   Cardiovascular: Normal rate, regular rhythm, normal heart sounds and intact distal pulses. Exam reveals no gallop and no friction rub.   No murmur heard.  Pulmonary/Chest: Effort normal and breath sounds normal. No stridor. No respiratory distress. He has no wheezes. He has no rales. He exhibits no tenderness.   Abdominal: Soft. Bowel sounds are normal. He exhibits no distension and no mass. There is no tenderness. There is no rebound and no guarding. No hernia.   Musculoskeletal: Normal range of motion. He exhibits no edema.   Lymphadenopathy:     He has no cervical adenopathy.   Neurological: He is alert and oriented to person, place, and time.   Skin: Skin is warm and dry. No rash noted. He is not diaphoretic.   Psychiatric: He has a normal mood and affect. His behavior is normal. Judgment and thought  content normal.   Vitals reviewed.          He is currently taking oral antihistamines, so unable to skin prick test today      Assessment:       1. Allergic rhinitis, unspecified seasonality, unspecified trigger         Plan:       Allergic rhinitis, unspecified seasonality, unspecified trigger  -     Ambulatory referral/consult to Allergy  -     IgE; Future; Expected date: 06/03/2020  -     Bahia grass IgE; Future; Expected date: 06/03/2020  -     Aspergillus fumagatus IgE; Future; Expected date: 06/03/2020  -     Chaetomium globosum IgE; Future; Expected date: 06/03/2020  -     Cockroach, American IgE; Future; Expected date: 06/03/2020  -     Cladosporium IgE; Future; Expected date: 06/03/2020  -     Curvularia lunata IgE; Future; Expected date: 06/03/2020  -     D. farinae IgE; Future; Expected date: 06/03/2020  -     D. pteronyssinus IgE; Future; Expected date: 06/03/2020  -     Dog dander IgE; Future; Expected date: 06/03/2020  -     Plantain, English IgE; Future; Expected date: 06/03/2020  -     Eucalyptus IgE; Future; Expected date: 06/03/2020  -     Stoddard elder, rough IgE; Future; Expected date: 06/03/2020  -     Mugwort IgE; Future; Expected date: 06/03/2020  -     Nettle IgE; Future; Expected date: 06/03/2020  -     Orchard grass IgE; Future; Expected date: 06/03/2020  -     Chowan, western white IgE; Future; Expected date: 06/03/2020  -     Ragweed, short, common IgE; Future; Expected date: 06/03/2020  -     Red top grass IgE; Future; Expected date: 06/03/2020  -     Rye grass, cultivated IgE; Future; Expected date: 06/03/2020  -     Thistle, Russian IgE; Future; Expected date: 06/03/2020  -     Stemphyllium IgE; Future; Expected date: 06/03/2020  -     Fredy IgE; Future; Expected date: 06/03/2020  -     Alistair grass IgE; Future; Expected date: 06/03/2020  -     Allergen, Pecan Tree IgE; Future; Expected date: 06/03/2020  -     [place], black IgE; Future; Expected date: 06/03/2020  -     [place], bald  IgE; Future; Expected date: 06/03/2020  -     Oak, white IgE; Future; Expected date: 06/03/2020  -     Allergen, Cocklebur; Future; Expected date: 06/03/2020  -     Cat epithelium IgE; Future; Expected date: 06/03/2020  -     azelastine (ASTELIN) 137 mcg (0.1 %) nasal spray; 1 spray (137 mcg total) by Nasal route 2 (two) times daily.  Dispense: 20 mL; Refill: 5    Will contact with lab results, once they become available.  RTC 3-4 months or sooner, if needed.

## 2020-06-04 LAB — IGE SERPL-ACNC: 88 IU/ML (ref 0–100)

## 2020-06-05 ENCOUNTER — OFFICE VISIT (OUTPATIENT)
Dept: OPHTHALMOLOGY | Facility: CLINIC | Age: 30
End: 2020-06-05
Payer: COMMERCIAL

## 2020-06-05 DIAGNOSIS — H53.149 ACCOMMODATIVE ASTHENOPIA: ICD-10-CM

## 2020-06-05 DIAGNOSIS — H53.8 BLURRED VISION, BILATERAL: Primary | ICD-10-CM

## 2020-06-05 PROCEDURE — 92015 PR REFRACTION: ICD-10-PCS | Mod: S$GLB,,, | Performed by: OPTOMETRIST

## 2020-06-05 PROCEDURE — 99999 PR PBB SHADOW E&M-EST. PATIENT-LVL II: CPT | Mod: PBBFAC,,, | Performed by: OPTOMETRIST

## 2020-06-05 PROCEDURE — 92004 PR EYE EXAM, NEW PATIENT,COMPREHESV: ICD-10-PCS | Mod: S$GLB,,, | Performed by: OPTOMETRIST

## 2020-06-05 PROCEDURE — 92004 COMPRE OPH EXAM NEW PT 1/>: CPT | Mod: S$GLB,,, | Performed by: OPTOMETRIST

## 2020-06-05 PROCEDURE — 92015 DETERMINE REFRACTIVE STATE: CPT | Mod: S$GLB,,, | Performed by: OPTOMETRIST

## 2020-06-05 PROCEDURE — 99999 PR PBB SHADOW E&M-EST. PATIENT-LVL II: ICD-10-PCS | Mod: PBBFAC,,, | Performed by: OPTOMETRIST

## 2020-06-05 NOTE — PROGRESS NOTES
HPI     New Patient  Last Eye Exam over 5 years ago  HPI    Any vision changes since last exam: Yes  Eye pain: No  Other ocular symptoms: No    Do you wear currently wear glasses or contacts? None    Interested in contacts today? No    Do you plan on getting new glasses today? Yes, if needed        Last edited by Kristin Pope on 6/5/2020  8:09 AM. (History)            Assessment /Plan     For exam results, see Encounter Report.    Blurred vision, bilateral    Accommodative asthenopia      Eyeglass Final Rx     Eyeglass Final Rx       Sphere    Right +0.75    Left +0.75    Type:  near only    Expiration Date:  6/6/2021                RTC 1 yr for undilated eye exam or PRN if any problems.   Discussed above and answered questions.

## 2020-06-08 ENCOUNTER — PATIENT MESSAGE (OUTPATIENT)
Dept: ALLERGY | Facility: CLINIC | Age: 30
End: 2020-06-08

## 2020-06-08 LAB
A FUMIGATUS IGE QN: 0.55 KU/L
ALLERGEN CHAETOMIUM GLOBOSUM IGE: 0.53 KU/L
ALLERGEN WHITE PINE TREE IGE: <0.1 KU/L
BAHIA GRASS IGE QN: 1.03 KU/L
BALD CYPRESS IGE QN: 0.27 KU/L
C HERBARUM IGE QN: 0.47 KU/L
C LUNATA IGE QN: 1.76 KU/L
CAT DANDER IGE QN: <0.1 KU/L
CHAETOMIUM GLOB. CLASS: ABNORMAL
COCKLEBUR IGE QN: <0.1 KU/L
COCKSFOOT IGE QN: 2.07 KU/L
COMMON RAGWEED IGE QN: <0.1 KU/L
D FARINAE IGE QN: <0.1 KU/L
D PTERONYSS IGE QN: <0.1 KU/L
DEPRECATED A FUMIGATUS IGE RAST QL: ABNORMAL
DEPRECATED BAHIA GRASS IGE RAST QL: ABNORMAL
DEPRECATED BALD CYPRESS IGE RAST QL: ABNORMAL
DEPRECATED C HERBARUM IGE RAST QL: ABNORMAL
DEPRECATED C LUNATA IGE RAST QL: ABNORMAL
DEPRECATED CAT DANDER IGE RAST QL: NORMAL
DEPRECATED COCKLEBUR IGE RAST QL: NORMAL
DEPRECATED COCKSFOOT IGE RAST QL: ABNORMAL
DEPRECATED COMMON RAGWEED IGE RAST QL: NORMAL
DEPRECATED D FARINAE IGE RAST QL: NORMAL
DEPRECATED D PTERONYSS IGE RAST QL: NORMAL
DEPRECATED DOG DANDER IGE RAST QL: NORMAL
DEPRECATED ELDER IGE RAST QL: ABNORMAL
DEPRECATED ENGL PLANTAIN IGE RAST QL: NORMAL
DEPRECATED GUM-TREE IGE RAST QL: NORMAL
DEPRECATED JOHNSON GRASS IGE RAST QL: ABNORMAL
DEPRECATED MUGWORT IGE RAST QL: NORMAL
DEPRECATED NETTLE IGE RAST QL: NORMAL
DEPRECATED PECAN/HICK TREE IGE RAST QL: ABNORMAL
DEPRECATED PER RYE GRASS IGE RAST QL: ABNORMAL
DEPRECATED RED TOP GRASS IGE RAST QL: ABNORMAL
DEPRECATED ROACH IGE RAST QL: NORMAL
DEPRECATED SALTWORT IGE RAST QL: NORMAL
DEPRECATED TIMOTHY IGE RAST QL: ABNORMAL
DEPRECATED WHITE OAK IGE RAST QL: NORMAL
DEPRECATED WILLOW IGE RAST QL: NORMAL
DOG DANDER IGE QN: <0.1 KU/L
ELDER IGE QN: 0.22 KU/L
ENGL PLANTAIN IGE QN: <0.1 KU/L
GUM-TREE IGE QN: <0.1 KU/L
JOHNSON GRASS IGE QN: 0.88 KU/L
MUGWORT IGE QN: <0.1 KU/L
NETTLE IGE QN: <0.1 KU/L
PECAN/HICK TREE IGE QN: 0.71 KU/L
PER RYE GRASS IGE QN: 2.53 KU/L
RED TOP GRASS IGE QN: 2.61 KU/L
ROACH IGE QN: <0.1 KU/L
SALTWORT IGE QN: <0.1 KU/L
STEMPHYLIUM HERBARUM CLASS: ABNORMAL
STEMPHYLLIUM, IGE: 4.6 KU/L
TIMOTHY IGE QN: 1.86 KU/L
WHITE OAK IGE QN: <0.1 KU/L
WHITE PINE CLASS: NORMAL
WILLOW IGE QN: <0.1 KU/L

## 2020-06-08 NOTE — TELEPHONE ENCOUNTER
Spoke with pt and explained IT program. Pt stated agreement to receive IT and scheduled appt to sign consent on 6/10/2020.

## 2020-06-12 ENCOUNTER — OFFICE VISIT (OUTPATIENT)
Dept: ALLERGY | Facility: CLINIC | Age: 30
End: 2020-06-12
Payer: COMMERCIAL

## 2020-06-12 VITALS
BODY MASS INDEX: 38.06 KG/M2 | WEIGHT: 265.88 LBS | TEMPERATURE: 98 F | SYSTOLIC BLOOD PRESSURE: 120 MMHG | HEART RATE: 60 BPM | DIASTOLIC BLOOD PRESSURE: 80 MMHG | HEIGHT: 70 IN

## 2020-06-12 DIAGNOSIS — Z91.048 ALLERGY TO MOLD: ICD-10-CM

## 2020-06-12 DIAGNOSIS — J30.1 SEASONAL ALLERGIC RHINITIS DUE TO POLLEN: Primary | ICD-10-CM

## 2020-06-12 PROCEDURE — 3008F PR BODY MASS INDEX (BMI) DOCUMENTED: ICD-10-PCS | Mod: CPTII,S$GLB,, | Performed by: ALLERGY & IMMUNOLOGY

## 2020-06-12 PROCEDURE — 99999 PR PBB SHADOW E&M-EST. PATIENT-LVL III: ICD-10-PCS | Mod: PBBFAC,,, | Performed by: ALLERGY & IMMUNOLOGY

## 2020-06-12 PROCEDURE — 3008F BODY MASS INDEX DOCD: CPT | Mod: CPTII,S$GLB,, | Performed by: ALLERGY & IMMUNOLOGY

## 2020-06-12 PROCEDURE — 99213 PR OFFICE/OUTPT VISIT, EST, LEVL III, 20-29 MIN: ICD-10-PCS | Mod: S$GLB,,, | Performed by: ALLERGY & IMMUNOLOGY

## 2020-06-12 PROCEDURE — 99999 PR PBB SHADOW E&M-EST. PATIENT-LVL III: CPT | Mod: PBBFAC,,, | Performed by: ALLERGY & IMMUNOLOGY

## 2020-06-12 PROCEDURE — 99213 OFFICE O/P EST LOW 20 MIN: CPT | Mod: S$GLB,,, | Performed by: ALLERGY & IMMUNOLOGY

## 2020-06-12 RX ORDER — EPINEPHRINE 0.3 MG/.3ML
1 INJECTION SUBCUTANEOUS ONCE
Qty: 2 DEVICE | Refills: 3 | Status: SHIPPED | OUTPATIENT
Start: 2020-06-12 | End: 2023-03-29

## 2020-06-12 RX ORDER — CETIRIZINE HYDROCHLORIDE 10 MG/1
10 TABLET ORAL DAILY
Qty: 30 TABLET | Refills: 11 | Status: SHIPPED | OUTPATIENT
Start: 2020-06-12 | End: 2021-08-05 | Stop reason: ALTCHOICE

## 2020-06-12 NOTE — PROGRESS NOTES
"Subjective:       Patient ID: Jorge Luis Gilmore is a 30 y.o. male.    Chief Complaint:  Other (sign consent for allergy immunotherapy)      HPI 6/3/2020:  30 year old male complaining of runny nose, watery eyes, itchy eyes, and nasal congestion. Symptoms are year round and worse in the Spring. He takes Xyzal and Flonase, which help "somewhat". He has lived here all of his life. He works in a chemical plant. He has not had allergy testing in the past. He has a history of asthma and last used albuterol 12 years ago. He denies any current symptoms. He reports eczema, but he feels that it is well controlled.    HPI today: he is interested in allergy immunotherapy. He is here today to sign consent. He reports runny nose, nasal congestion, and intermittent sinus pressure. He denies itchy or watery eyes. He is not consistently taking prescribed medication-Xyzal. He does take Astelin twice a day.    Past Medical History:   Diagnosis Date    Asthma     Delayed immunizations 4/2/2019    Paronychia of right thumb 5/23/2018    Routine general medical examination at a health care facility 4/15/2019    Viral upper respiratory tract infection 4/2/2019     Family History   Problem Relation Age of Onset    No Known Problems Mother     No Known Problems Father     Heart disease Maternal Grandmother     Diabetes Maternal Grandmother     Heart disease Maternal Grandfather     Prostate cancer Paternal Grandfather      Current Outpatient Medications on File Prior to Visit   Medication Sig Dispense Refill    azelastine (ASTELIN) 137 mcg (0.1 %) nasal spray 1 spray (137 mcg total) by Nasal route 2 (two) times daily. 20 mL 5    levocetirizine (XYZAL) 5 MG tablet Take 1 tablet (5 mg total) by mouth every evening. 30 tablet 5    fluticasone propionate (FLONASE) 50 mcg/actuation nasal spray 2 sprays (100 mcg total) by Each Nostril route once daily. (Patient not taking: Reported on 6/12/2020) 16 g 5     No current " facility-administered medications on file prior to visit.        Review of patient's allergies indicates:   Allergen Reactions    Amoxicillin Diarrhea         Environmental History:  No pets. He does not smoke.  Review of Systems   Constitutional: Negative for chills and fever.   HENT: Positive for congestion, rhinorrhea and sneezing.    Eyes: Positive for discharge and itching.   Respiratory: Negative for shortness of breath and wheezing.    Cardiovascular: Negative for chest pain and leg swelling.   Gastrointestinal: Negative for nausea and vomiting.   Endocrine: Negative for cold intolerance and heat intolerance.   Genitourinary: Negative for dysuria and frequency.   Skin: Positive for rash. Negative for wound.   Allergic/Immunologic: Positive for environmental allergies. Negative for food allergies.   Neurological: Negative for dizziness and light-headedness.   Hematological: Negative for adenopathy. Does not bruise/bleed easily.   Psychiatric/Behavioral: Negative for agitation, confusion and suicidal ideas.        Objective:    Physical Exam   Constitutional: He is oriented to person, place, and time. He appears well-developed and well-nourished.   HENT:   Head: Normocephalic and atraumatic.   Right Ear: External ear normal.   Left Ear: External ear normal.   Nose: Nose normal.   Mouth/Throat: Oropharynx is clear and moist. No oropharyngeal exudate.   Eyes: Pupils are equal, round, and reactive to light. EOM are normal. Right eye exhibits no discharge. Left eye exhibits no discharge. No scleral icterus.   Neck: Normal range of motion. Neck supple. No tracheal deviation present. No thyromegaly present.   Cardiovascular: Normal rate, regular rhythm, normal heart sounds and intact distal pulses. Exam reveals no gallop and no friction rub.   No murmur heard.  Pulmonary/Chest: Effort normal and breath sounds normal. No stridor. No respiratory distress. He has no wheezes. He has no rales. He exhibits no tenderness.    Abdominal: Soft. Bowel sounds are normal. He exhibits no distension and no mass. There is no tenderness. There is no rebound and no guarding. No hernia.   Musculoskeletal: Normal range of motion. He exhibits no edema.   Lymphadenopathy:     He has no cervical adenopathy.   Neurological: He is alert and oriented to person, place, and time.   Skin: Skin is warm and dry. No rash noted. He is not diaphoretic.   Psychiatric: He has a normal mood and affect. His behavior is normal. Judgment and thought content normal.   Vitals reviewed.              Assessment:       1. Seasonal allergic rhinitis due to pollen    2. Allergy to mold         Plan:       Seasonal allergic rhinitis due to pollen  -     cetirizine (ZYRTEC) 10 MG tablet; Take 1 tablet (10 mg total) by mouth once daily.  Dispense: 30 tablet; Refill: 11    Allergy to mold    Other orders  -     EPINEPHrine (EPIPEN) 0.3 mg/0.3 mL AtIn; Inject 0.3 mLs (0.3 mg total) into the muscle once. for 1 dose  Dispense: 2 Device; Refill: 3       Allergy Avoidance measures were given.  Cetirizine was prescribed per his request.  Recommend he take Xyzal and Astelin daily.  Discussed allergy immunotherapy. Risk and benefits explained, including but not limited to anaphylaxis, infection and bleeding. He was prescribed an Epipen and advised that he must bring it with him when he receives his injections. He was also advised that he must wait 30 minutes after each injection in the office. Questions were answered. He voiced understanding and consent was signed.  RTC 6-12 months or sooner, if needed.

## 2020-07-20 ENCOUNTER — CLINICAL SUPPORT (OUTPATIENT)
Dept: ALLERGY | Facility: CLINIC | Age: 30
End: 2020-07-20
Payer: COMMERCIAL

## 2020-07-20 DIAGNOSIS — J30.1 SEASONAL ALLERGIC RHINITIS DUE TO POLLEN: ICD-10-CM

## 2020-07-20 DIAGNOSIS — J30.89 ALLERGIC RHINITIS DUE TO MOLD: ICD-10-CM

## 2020-07-20 PROCEDURE — 99499 NO LOS: ICD-10-PCS | Mod: S$GLB,,, | Performed by: ALLERGY & IMMUNOLOGY

## 2020-07-20 PROCEDURE — 99499 UNLISTED E&M SERVICE: CPT | Mod: S$GLB,,, | Performed by: ALLERGY & IMMUNOLOGY

## 2020-07-20 PROCEDURE — 95117 IMMUNOTHERAPY INJECTIONS: CPT | Mod: S$GLB,,, | Performed by: ALLERGY & IMMUNOLOGY

## 2020-07-20 PROCEDURE — 95117 PR IMMU2THERAPY, 2+ INJECTIONS: ICD-10-PCS | Mod: S$GLB,,, | Performed by: ALLERGY & IMMUNOLOGY

## 2020-07-24 ENCOUNTER — CLINICAL SUPPORT (OUTPATIENT)
Dept: ALLERGY | Facility: CLINIC | Age: 30
End: 2020-07-24
Payer: COMMERCIAL

## 2020-07-24 DIAGNOSIS — J30.1 SEASONAL ALLERGIC RHINITIS DUE TO POLLEN: ICD-10-CM

## 2020-07-24 DIAGNOSIS — J30.89 ALLERGIC RHINITIS DUE TO MOLD: ICD-10-CM

## 2020-07-24 PROCEDURE — 95117 IMMUNOTHERAPY INJECTIONS: CPT | Mod: S$GLB,,, | Performed by: ALLERGY & IMMUNOLOGY

## 2020-07-24 PROCEDURE — 99499 UNLISTED E&M SERVICE: CPT | Mod: S$GLB,,, | Performed by: ALLERGY & IMMUNOLOGY

## 2020-07-24 PROCEDURE — 95117 PR IMMU2THERAPY, 2+ INJECTIONS: ICD-10-PCS | Mod: S$GLB,,, | Performed by: ALLERGY & IMMUNOLOGY

## 2020-07-24 PROCEDURE — 99499 NO LOS: ICD-10-PCS | Mod: S$GLB,,, | Performed by: ALLERGY & IMMUNOLOGY

## 2020-07-31 ENCOUNTER — HOSPITAL ENCOUNTER (EMERGENCY)
Facility: HOSPITAL | Age: 30
Discharge: HOME | End: 2020-07-31
Attending: EMERGENCY MEDICINE
Payer: COMMERCIAL

## 2020-07-31 VITALS
DIASTOLIC BLOOD PRESSURE: 79 MMHG | HEART RATE: 73 BPM | BODY MASS INDEX: 38.24 KG/M2 | OXYGEN SATURATION: 99 % | SYSTOLIC BLOOD PRESSURE: 137 MMHG | TEMPERATURE: 99 F | RESPIRATION RATE: 18 BRPM | WEIGHT: 266.56 LBS

## 2020-07-31 DIAGNOSIS — R51.9 NONINTRACTABLE HEADACHE, UNSPECIFIED CHRONICITY PATTERN, UNSPECIFIED HEADACHE TYPE: ICD-10-CM

## 2020-07-31 DIAGNOSIS — J06.9 UPPER RESPIRATORY TRACT INFECTION, UNSPECIFIED TYPE: Primary | ICD-10-CM

## 2020-07-31 PROCEDURE — U0003 INFECTIOUS AGENT DETECTION BY NUCLEIC ACID (DNA OR RNA); SEVERE ACUTE RESPIRATORY SYNDROME CORONAVIRUS 2 (SARS-COV-2) (CORONAVIRUS DISEASE [COVID-19]), AMPLIFIED PROBE TECHNIQUE, MAKING USE OF HIGH THROUGHPUT TECHNOLOGIES AS DESCRIBED BY CMS-2020-01-R: HCPCS

## 2020-07-31 PROCEDURE — 25000003 PHARM REV CODE 250: Mod: ER | Performed by: EMERGENCY MEDICINE

## 2020-07-31 PROCEDURE — 99283 EMERGENCY DEPT VISIT LOW MDM: CPT | Mod: ER

## 2020-07-31 RX ORDER — DIPHENHYDRAMINE HCL 25 MG
25 CAPSULE ORAL
Status: COMPLETED | OUTPATIENT
Start: 2020-07-31 | End: 2020-07-31

## 2020-07-31 RX ORDER — METOCLOPRAMIDE 5 MG/1
10 TABLET ORAL
Status: COMPLETED | OUTPATIENT
Start: 2020-07-31 | End: 2020-07-31

## 2020-07-31 RX ADMIN — METOCLOPRAMIDE 10 MG: 5 TABLET ORAL at 05:07

## 2020-07-31 RX ADMIN — DIPHENHYDRAMINE HYDROCHLORIDE 25 MG: 25 CAPSULE ORAL at 05:07

## 2020-07-31 NOTE — Clinical Note
"Jorge Luis "Richa Gilmore was seen and treated in our emergency department on 7/31/2020.     COVID-19 is present in our communities across the state. There is limited testing for COVID at this time, so not all patients can be tested. In this situation, your employee meets the following criteria:    Jorge Luis Gilmore has met the criteria for COVID-19 testing based upon symptoms, travel, and/or potential exposure. The test has been completed and is pending results at this time. During this time the employee is not able to work and should be quarantined per the Centers for Disease Control timelines.     If you have any questions or concerns, or if I can be of further assistance, please do not hesitate to contact me.    Sincerely,             Esau Weeks MD"

## 2020-08-01 DIAGNOSIS — U07.1 COVID-19 VIRUS DETECTED: ICD-10-CM

## 2020-08-01 LAB — SARS-COV-2 RNA RESP QL NAA+PROBE: DETECTED

## 2020-08-03 ENCOUNTER — OFFICE VISIT (OUTPATIENT)
Dept: INTERNAL MEDICINE | Facility: CLINIC | Age: 30
End: 2020-08-03
Payer: COMMERCIAL

## 2020-08-03 DIAGNOSIS — U07.1 COVID-19 VIRUS INFECTION: Primary | ICD-10-CM

## 2020-08-03 DIAGNOSIS — J32.4 CHRONIC PANSINUSITIS: ICD-10-CM

## 2020-08-03 PROCEDURE — 99213 OFFICE O/P EST LOW 20 MIN: CPT | Mod: 95,,, | Performed by: NURSE PRACTITIONER

## 2020-08-03 PROCEDURE — 99213 PR OFFICE/OUTPT VISIT, EST, LEVL III, 20-29 MIN: ICD-10-PCS | Mod: 95,,, | Performed by: NURSE PRACTITIONER

## 2020-08-03 RX ORDER — KETOROLAC TROMETHAMINE 10 MG/1
TABLET, FILM COATED ORAL
COMMUNITY
Start: 2020-07-31 | End: 2021-04-15

## 2020-08-03 NOTE — PROGRESS NOTES
Subjective:       Patient ID: Jorge Luis Gilmore is a 30 y.o. male.    Chief Complaint: No chief complaint on file.    Mr. Gilmore presents to virtual visit with c/o of sinusitis and headaches since Thursday. He was seen in ED on 7/31/2020, dxd'd with COVID, and treated with reglan and benadryl in ED. He was also seen in urgent care on same night and sent home with rx for toradol. He is still taking toradol as needed for headache. Headache has improved. He has not had fever since Friday. He states that it feels like his normal sinus infection. He is being treated by allergist currently with allergy shots also. Takes astelin and zyrtec or xyzal daily.     Headache   This is a recurrent problem. The current episode started in the past 7 days. The problem occurs intermittently. The problem has been gradually improving. The pain is located in the frontal region. The pain does not radiate. Associated symptoms include coughing, a fever (resolved), rhinorrhea and sinus pressure. Pertinent negatives include no abdominal pain, back pain, blurred vision, dizziness, ear pain, eye pain, hearing loss, muscle aches, nausea, neck pain, phonophobia, photophobia, sore throat, tingling, vomiting or weakness. Nothing aggravates the symptoms. He has tried NSAIDs for the symptoms. The treatment provided significant relief. His past medical history is significant for sinus disease. There is no history of hypertension.       Patient Active Problem List   Diagnosis    Lumbar strain    Acute recurrent maxillary sinusitis    Chronic pansinusitis    COVID-19 virus infection       Family History   Problem Relation Age of Onset    No Known Problems Mother     No Known Problems Father     Heart disease Maternal Grandmother     Diabetes Maternal Grandmother     Heart disease Maternal Grandfather     Prostate cancer Paternal Grandfather      History reviewed. No pertinent surgical history.      Current Outpatient Medications:      azelastine (ASTELIN) 137 mcg (0.1 %) nasal spray, 1 spray (137 mcg total) by Nasal route 2 (two) times daily., Disp: 20 mL, Rfl: 5    cetirizine (ZYRTEC) 10 MG tablet, Take 1 tablet (10 mg total) by mouth once daily., Disp: 30 tablet, Rfl: 11    EPINEPHrine (EPIPEN) 0.3 mg/0.3 mL AtIn, Inject 0.3 mLs (0.3 mg total) into the muscle once. for 1 dose, Disp: 2 Device, Rfl: 3    ketorolac (TORADOL) 10 mg tablet, , Disp: , Rfl:     levocetirizine (XYZAL) 5 MG tablet, Take 1 tablet (5 mg total) by mouth every evening., Disp: 30 tablet, Rfl: 5    Review of Systems   Constitutional: Positive for fatigue (improving) and fever (resolved). Negative for activity change, appetite change, chills, diaphoresis and unexpected weight change.   HENT: Positive for congestion, postnasal drip, rhinorrhea, sinus pressure, sinus pain, sneezing and voice change. Negative for ear pain, hearing loss, sore throat and trouble swallowing.    Eyes: Negative for blurred vision, photophobia, pain, discharge and visual disturbance.   Respiratory: Positive for cough. Negative for chest tightness, shortness of breath and wheezing.    Cardiovascular: Negative for chest pain and palpitations.   Gastrointestinal: Negative for abdominal pain, blood in stool, constipation, diarrhea, nausea and vomiting.   Endocrine: Negative for polydipsia and polyuria.   Genitourinary: Negative for difficulty urinating, hematuria and urgency.   Musculoskeletal: Negative for arthralgias, back pain, joint swelling and neck pain.   Neurological: Positive for headaches (improving). Negative for dizziness, tingling and weakness.   Psychiatric/Behavioral: Negative for confusion and dysphoric mood.       Objective:   There were no vitals taken for this visit.     Physical Exam  Constitutional:       General: He is not in acute distress.     Appearance: Normal appearance. He is not ill-appearing.   HENT:      Head: Normocephalic and atraumatic.   Pulmonary:      Effort:  "Pulmonary effort is normal. No respiratory distress.   Neurological:      Mental Status: He is alert and oriented to person, place, and time.   Psychiatric:         Mood and Affect: Mood normal.         Behavior: Behavior normal.         Assessment & Plan     Problem List Items Addressed This Visit        ENT    Chronic pansinusitis    Current Assessment & Plan     Continue antihistamine and astelin per allergist.             Other    COVID-19 virus infection - Primary    Current Assessment & Plan     Positive test on 7/31/2020. Discussed COVID precautions with pt. Continue symptomatic tx.                 Follow up if symptoms worsen or fail to improve.          Portions of this note may have been created with voice recognition software. Occasional "wrong-word" or "sound-a-like" substitutions may have occurred due to the inherent limitations of voice recognition software. Please, read the note carefully and recognize, using context, where substitutions have occurred.       "

## 2020-08-03 NOTE — PATIENT INSTRUCTIONS
Instructions for Patients with Confirmed or Suspected COVID-19    If you are awaiting your test result, you will either be called or it will be released to the patient portal.  If you have any questions about your test, please visit [email or web address] or call our COVID-19 information line at [phone].      Instructions for non-hospitalized or discharged patients with confirmed or suspected COVID-19:       Stay home except to get medical care.    Separate yourself from other people and animals in your home.    Call ahead before visiting your doctor.    Wear a face mask.    Cover your coughs and sneezes.    Clean your hands often.    Avoid sharing personal household items.    Clean all high-touch surfaces every day.    Monitor your symptoms. Seek prompt medical attention if your illness is worsening (e.g., difficulty breathing). Before seeking care, call your healthcare provider.    If you have a medical emergency and must call 911, notify the dispatcher that you have or are being evaluated for COVID-19. If possible, put on a face mask before emergency medical services arrive.    Use the following symptom-based strategy to return to normal activity following a suspected or confirmed case of COVID-19. Continue isolation until:   o At least 3 days (72 hours) have passed since recovery defined as resolution of fever without the use of fever-reducing medications and improvement in respiratory symptoms (e.g. cough, shortness of breath), and   o At least 10 days have passed since the first positive test.       As one of the next steps, you will receive a call or text from the [place] Department of Health ([facility]) COVID-19 Tracing Team. See the contact information below so you know not to ignore the health departments call. It is important that you contact them back immediately so they can help.     Contact Tracer Number:  [phone]  Caller ID for most carriers: [place] Dept [facility]    What is  contact tracing?   Contact tracing is a process that helps identify everyone who has been in close contact with an infected person. Contact tracers let those people know they may have been exposed and guide them on next steps. Confidentiality is important for everyone; no one will be told who may have exposed them to the virus.   Your involvement is important. The more we know about where and how this virus is spreading, the better chance we have at stopping it from spreading further.  What does exposure mean?   Exposure means you have been within 6 feet for more than 15 minutes with a person who has or had COVID-19.  What kind of questions do the contact tracers ask?   A contact tracer will confirm your basic contact information including name, address, phone number, and next of kin, as well as asking about any symptoms you may have had. Theyll also ask you how you think you may have gotten sick, such as places where you may have been exposed to the virus, and people you were with. Those names will never be shared with anyone outside of that call, and will only be used to help trace and stop the spread of the virus.   I have privacy concerns. How will the state use my information?   Your privacy about your health is important. All calls are completed using call centers that use the appropriate health privacy protection measures (HIPAA compliance), meaning that your patient information is safe. No one will ever ask you any questions related to immigration status. Your health comes first.   Do I have to participate?   You do not have to participate, but we strongly encourage you to. Contact tracing can help us catch and control new outbreaks as theyre developing to keep your friends and family safe.   What if I dont hear from anyone?   If you dont receive a call within 24 hours, you can call the number above right away to inquire about your status. That line is open from 8:00 am - 8:00 p.m., 7 days a  week.  Contact tracing saves lives! Together, we have the power to beat this virus and keep our loved ones and neighbors safe.       Instructions for household members, intimate partners and caregivers in a non-healthcare setting of a patient with confirmed or suspected COVID-19:         Close contacts should monitor their health and call their healthcare provider right away if they develop symptoms suggestive of COVID-19 (e.g., fever, cough, shortness of breath).    Stay home except to get medical care. Separate yourself from other people and animals in the home.   Monitor the patients symptoms. If the patient is getting sicker, call his or her healthcare provider. If the patient has a medical emergency and you need to call 911, notify the dispatch personnel that the patient has or is being evaluated for COVID-19.    Wear a facemask when around other people such as sharing a room or vehicle and before entering a healthcare provider's office.   Cover coughs and sneezes with a tissue. Throw used tissues in a lined trash can immediately and wash hands.   Clean hands often with soap and water for at least 20 seconds or with an alcohol-based hand , rubbing hands together until they feel dry. Avoid touching your eyes, nose, and mouth with unwashed hands.   Clean all high-touch; surfaces every day, including counters, tabletops, doorknobs, bathroom fixtures, toilets, phones, keyboards, tablets, bedside tables, etc. Use a household cleaning spray or wipe according to label instructions.   Avoid sharing personal household items such as dishes, drinking glasses, cups, towels, bedding, etc. After these items are used, they should be washed thoroughly with soap and water.   Continue isolation until:   At least 3 days (72 hours) have passed since recovery defined as resolution of fever without the use of fever-reducing medications and improvement in respiratory symptoms (e.g. cough, shortness of breath),  and    At least 10 days have passed since the patients first positive test.    https://www.cdc.gov/coronavirus/2019-ncov/your-health/index.htm

## 2020-08-03 NOTE — LETTER
[address] [facility] [place] [address]  Phone: [phone]  Fax: [phone]          Return to Work/School    Patient: Jorge Luis Gilmore  YOB: 1990   Date: 08/03/2020     To Whom It May Concern:     Jorge Luis Gilmore was in contact with/seen in my office on 08/03/2020. COVID-19 is present in our communities across the state. There is limited testing for COVID at this time, so not all patients can be tested. In this situation, your employee meets the following criteria:     Jorge Luis Gilmore has met the criteria for COVID-19 testing and has a POSITIVE result. He can return to work once they are asymptomatic for 72 hours without the use of fever reducing medications AND at least ten days from the first positive result. Mr. Gilmore is cleared to return to work on 8/12/2020.      If you have any questions or concerns, or if I can be of further assistance, please do not hesitate to contact me.     Sincerely,    Raine Amanda NP

## 2020-08-04 ENCOUNTER — PATIENT MESSAGE (OUTPATIENT)
Dept: INTERNAL MEDICINE | Facility: CLINIC | Age: 30
End: 2020-08-04

## 2020-08-04 DIAGNOSIS — Z86.16 HISTORY OF 2019 NOVEL CORONAVIRUS DISEASE (COVID-19): ICD-10-CM

## 2020-08-06 ENCOUNTER — PATIENT MESSAGE (OUTPATIENT)
Dept: INTERNAL MEDICINE | Facility: CLINIC | Age: 30
End: 2020-08-06

## 2020-08-06 NOTE — TELEPHONE ENCOUNTER
Pt's job needs him to test negative before going back, tested positive 31st. Can you place order.

## 2020-08-14 ENCOUNTER — LAB VISIT (OUTPATIENT)
Dept: LAB | Facility: HOSPITAL | Age: 30
End: 2020-08-14
Attending: FAMILY MEDICINE
Payer: COMMERCIAL

## 2020-08-14 ENCOUNTER — TELEPHONE (OUTPATIENT)
Dept: INTERNAL MEDICINE | Facility: CLINIC | Age: 30
End: 2020-08-14

## 2020-08-14 DIAGNOSIS — Z20.822 CLOSE EXPOSURE TO COVID-19 VIRUS: ICD-10-CM

## 2020-08-14 DIAGNOSIS — Z20.822 CLOSE EXPOSURE TO COVID-19 VIRUS: Primary | ICD-10-CM

## 2020-08-14 PROCEDURE — U0003 INFECTIOUS AGENT DETECTION BY NUCLEIC ACID (DNA OR RNA); SEVERE ACUTE RESPIRATORY SYNDROME CORONAVIRUS 2 (SARS-COV-2) (CORONAVIRUS DISEASE [COVID-19]), AMPLIFIED PROBE TECHNIQUE, MAKING USE OF HIGH THROUGHPUT TECHNOLOGIES AS DESCRIBED BY CMS-2020-01-R: HCPCS

## 2020-08-16 LAB — SARS-COV-2 RNA RESP QL NAA+PROBE: NOT DETECTED

## 2020-10-15 NOTE — ED PROVIDER NOTES
"Encounter Date: 7/31/2020       History     Chief Complaint   Patient presents with    Headache     with otalgia, left sided headache. started yesterday morning.      29 y/o M with PMH of seasonal allergies presents to the ED with c/o 2 days of a left sided temporal headache that is mild in severity, non radiating, and described as a "felling you get when you turn your head real fast". Has associated left ear pain, cough, chest and nasal congestion. Patient denies any N/V, photophobia, phonophobia, fever, chest pain.         Review of patient's allergies indicates:   Allergen Reactions    Amoxicillin Diarrhea     Past Medical History:   Diagnosis Date    Asthma     Delayed immunizations 4/2/2019    Paronychia of right thumb 5/23/2018    Routine general medical examination at a health care facility 4/15/2019    Viral upper respiratory tract infection 4/2/2019     History reviewed. No pertinent surgical history.  Family History   Problem Relation Age of Onset    No Known Problems Mother     No Known Problems Father     Heart disease Maternal Grandmother     Diabetes Maternal Grandmother     Heart disease Maternal Grandfather     Prostate cancer Paternal Grandfather      Social History     Tobacco Use    Smoking status: Never Smoker    Smokeless tobacco: Never Used   Substance Use Topics    Alcohol use: Yes     Frequency: Monthly or less     Comment: occassional    Drug use: No     Review of Systems   Constitutional: Negative for chills and fever.   HENT: Positive for congestion and ear pain. Negative for dental problem.    Eyes: Negative for pain and visual disturbance.   Respiratory: Positive for cough. Negative for shortness of breath.    Cardiovascular: Negative for chest pain and palpitations.   Gastrointestinal: Negative for abdominal pain, diarrhea, nausea and vomiting.   Genitourinary: Negative for dysuria and flank pain.   Musculoskeletal: Negative for back pain and neck pain.   Skin: Negative " for rash and wound.   Neurological: Positive for headaches. Negative for weakness and numbness.       Physical Exam     Initial Vitals [07/31/20 0508]   BP Pulse Resp Temp SpO2   137/79 73 18 99 °F (37.2 °C) 99 %      MAP       --         Physical Exam    Constitutional: He appears well-developed and well-nourished. No distress.   HENT:   Head: Normocephalic and atraumatic.   Right Ear: External ear normal.   Left Ear: External ear normal.   Bialteral TM's normal.    Eyes: EOM are normal. Pupils are equal, round, and reactive to light.   Neck: Normal range of motion. Neck supple.   Cardiovascular: Normal rate and regular rhythm.   Pulmonary/Chest: Breath sounds normal. No respiratory distress.   Abdominal: He exhibits no distension. There is no abdominal tenderness.   Musculoskeletal: Normal range of motion. No tenderness.   Neurological: He is alert and oriented to person, place, and time. He has normal strength. No sensory deficit.   Skin: Skin is warm and dry.   Psychiatric: He has a normal mood and affect.         ED Course   Procedures  Labs Reviewed   SARS-COV-2 (COVID-19) QUALITATIVE PCR          Imaging Results    None                            ED Course as of Jul 31 0547 Fri Jul 31, 2020   0546 5:46 AM Reassessment: I reassessed the pt.  The pt is resting comfortably and is NAD.  Pt states their sx have improved. I Discussed test results, shared treatment plan, specific conditions for return, and the need for f/u. I  Answered their questions at this time.  Pt understands and agrees to the plan.  The pt has remained hemodynamically stable through ED course and is stable for discharge.       [BA]      ED Course User Index  [BA] Esau Weeks MD                Clinical Impression:       ICD-10-CM ICD-9-CM   1. Upper respiratory tract infection, unspecified type  J06.9 465.9   2. Nonintractable headache, unspecified chronicity pattern, unspecified headache type  R51 784.0         Disposition:   Disposition:  Discharged  Condition: Stable     ED Disposition Condition    Discharge Stable        ED Prescriptions     None        Follow-up Information     Follow up With Specialties Details Why Contact Info    Zee Rees MD Family Medicine Schedule an appointment as soon as possible for a visit in 2 days For re-evaluation and further treatment [id number] [address] [address]  [phone]      [facility] Emergency Medicine Go today If symptoms worsen, For re-evaluation and further treatment, As needed [id number] [facility]  [facility] [id number] [phone]                                     Esau Weeks MD  07/31/20 0547     99.1

## 2021-03-30 ENCOUNTER — PATIENT MESSAGE (OUTPATIENT)
Dept: INTERNAL MEDICINE | Facility: CLINIC | Age: 31
End: 2021-03-30

## 2021-04-07 ENCOUNTER — IMMUNIZATION (OUTPATIENT)
Dept: INTERNAL MEDICINE | Facility: CLINIC | Age: 31
End: 2021-04-07
Payer: COMMERCIAL

## 2021-04-07 DIAGNOSIS — Z23 NEED FOR VACCINATION: Primary | ICD-10-CM

## 2021-04-07 PROCEDURE — 91300 COVID-19, MRNA, LNP-S, PF, 30 MCG/0.3 ML DOSE VACCINE: CPT | Mod: PBBFAC | Performed by: FAMILY MEDICINE

## 2021-04-15 ENCOUNTER — OFFICE VISIT (OUTPATIENT)
Dept: INTERNAL MEDICINE | Facility: CLINIC | Age: 31
End: 2021-04-15
Payer: COMMERCIAL

## 2021-04-15 VITALS
DIASTOLIC BLOOD PRESSURE: 70 MMHG | BODY MASS INDEX: 37.71 KG/M2 | WEIGHT: 263.44 LBS | SYSTOLIC BLOOD PRESSURE: 110 MMHG | OXYGEN SATURATION: 97 % | TEMPERATURE: 98 F | HEIGHT: 70 IN | HEART RATE: 81 BPM

## 2021-04-15 DIAGNOSIS — J32.4 CHRONIC PANSINUSITIS: ICD-10-CM

## 2021-04-15 DIAGNOSIS — J30.9 ALLERGIC RHINITIS, UNSPECIFIED SEASONALITY, UNSPECIFIED TRIGGER: ICD-10-CM

## 2021-04-15 PROBLEM — U07.1 COVID-19 VIRUS INFECTION: Status: RESOLVED | Noted: 2020-08-03 | Resolved: 2021-04-15

## 2021-04-15 PROCEDURE — 99999 PR PBB SHADOW E&M-EST. PATIENT-LVL IV: ICD-10-PCS | Mod: PBBFAC,,, | Performed by: NURSE PRACTITIONER

## 2021-04-15 PROCEDURE — 1126F PR PAIN SEVERITY QUANTIFIED, NO PAIN PRESENT: ICD-10-PCS | Mod: S$GLB,,, | Performed by: NURSE PRACTITIONER

## 2021-04-15 PROCEDURE — 99213 OFFICE O/P EST LOW 20 MIN: CPT | Mod: S$GLB,,, | Performed by: NURSE PRACTITIONER

## 2021-04-15 PROCEDURE — 99213 PR OFFICE/OUTPT VISIT, EST, LEVL III, 20-29 MIN: ICD-10-PCS | Mod: S$GLB,,, | Performed by: NURSE PRACTITIONER

## 2021-04-15 PROCEDURE — 3008F PR BODY MASS INDEX (BMI) DOCUMENTED: ICD-10-PCS | Mod: CPTII,S$GLB,, | Performed by: NURSE PRACTITIONER

## 2021-04-15 PROCEDURE — 3008F BODY MASS INDEX DOCD: CPT | Mod: CPTII,S$GLB,, | Performed by: NURSE PRACTITIONER

## 2021-04-15 PROCEDURE — 99999 PR PBB SHADOW E&M-EST. PATIENT-LVL IV: CPT | Mod: PBBFAC,,, | Performed by: NURSE PRACTITIONER

## 2021-04-15 PROCEDURE — 1126F AMNT PAIN NOTED NONE PRSNT: CPT | Mod: S$GLB,,, | Performed by: NURSE PRACTITIONER

## 2021-04-15 RX ORDER — TRIAMCINOLONE ACETONIDE 55 UG/1
2 SPRAY, METERED NASAL DAILY
Qty: 16.9 ML | Refills: 1 | Status: SHIPPED | OUTPATIENT
Start: 2021-04-15 | End: 2022-08-05

## 2021-04-15 RX ORDER — LEVOCETIRIZINE DIHYDROCHLORIDE 5 MG/1
5 TABLET, FILM COATED ORAL NIGHTLY
Qty: 30 TABLET | Refills: 5 | Status: CANCELLED | OUTPATIENT
Start: 2021-04-15 | End: 2022-04-15

## 2021-04-15 RX ORDER — AZELASTINE 1 MG/ML
1 SPRAY, METERED NASAL 2 TIMES DAILY
Qty: 20 ML | Refills: 5 | Status: SHIPPED | OUTPATIENT
Start: 2021-04-15 | End: 2021-10-28

## 2021-04-15 RX ORDER — CETIRIZINE HYDROCHLORIDE 10 MG/1
10 TABLET ORAL DAILY
Qty: 30 TABLET | Refills: 11 | Status: CANCELLED | OUTPATIENT
Start: 2021-04-15 | End: 2022-04-15

## 2021-04-28 ENCOUNTER — IMMUNIZATION (OUTPATIENT)
Dept: INTERNAL MEDICINE | Facility: CLINIC | Age: 31
End: 2021-04-28
Payer: COMMERCIAL

## 2021-04-28 DIAGNOSIS — Z23 NEED FOR VACCINATION: Primary | ICD-10-CM

## 2021-04-28 PROCEDURE — 91300 COVID-19, MRNA, LNP-S, PF, 30 MCG/0.3 ML DOSE VACCINE: CPT | Mod: PBBFAC | Performed by: FAMILY MEDICINE

## 2021-04-28 PROCEDURE — 0002A COVID-19, MRNA, LNP-S, PF, 30 MCG/0.3 ML DOSE VACCINE: CPT | Mod: PBBFAC | Performed by: FAMILY MEDICINE

## 2021-08-05 ENCOUNTER — OFFICE VISIT (OUTPATIENT)
Dept: INTERNAL MEDICINE | Facility: CLINIC | Age: 31
End: 2021-08-05
Payer: COMMERCIAL

## 2021-08-05 VITALS
BODY MASS INDEX: 39.67 KG/M2 | HEART RATE: 74 BPM | WEIGHT: 277.13 LBS | HEIGHT: 70 IN | SYSTOLIC BLOOD PRESSURE: 114 MMHG | TEMPERATURE: 99 F | DIASTOLIC BLOOD PRESSURE: 70 MMHG | RESPIRATION RATE: 18 BRPM | OXYGEN SATURATION: 95 %

## 2021-08-05 DIAGNOSIS — J32.4 CHRONIC PANSINUSITIS: Primary | ICD-10-CM

## 2021-08-05 PROCEDURE — 96372 THER/PROPH/DIAG INJ SC/IM: CPT | Mod: S$GLB,ICN,, | Performed by: NURSE PRACTITIONER

## 2021-08-05 PROCEDURE — 3008F BODY MASS INDEX DOCD: CPT | Mod: CPTII,S$GLB,ICN, | Performed by: NURSE PRACTITIONER

## 2021-08-05 PROCEDURE — 96372 PR INJECTION,THERAP/PROPH/DIAG2ST, IM OR SUBCUT: ICD-10-PCS | Mod: S$GLB,ICN,, | Performed by: NURSE PRACTITIONER

## 2021-08-05 PROCEDURE — 3078F DIAST BP <80 MM HG: CPT | Mod: CPTII,S$GLB,ICN, | Performed by: NURSE PRACTITIONER

## 2021-08-05 PROCEDURE — 1160F PR REVIEW ALL MEDS BY PRESCRIBER/CLIN PHARMACIST DOCUMENTED: ICD-10-PCS | Mod: CPTII,S$GLB,ICN, | Performed by: NURSE PRACTITIONER

## 2021-08-05 PROCEDURE — 1159F PR MEDICATION LIST DOCUMENTED IN MEDICAL RECORD: ICD-10-PCS | Mod: CPTII,S$GLB,ICN, | Performed by: NURSE PRACTITIONER

## 2021-08-05 PROCEDURE — 3078F PR MOST RECENT DIASTOLIC BLOOD PRESSURE < 80 MM HG: ICD-10-PCS | Mod: CPTII,S$GLB,ICN, | Performed by: NURSE PRACTITIONER

## 2021-08-05 PROCEDURE — 3074F SYST BP LT 130 MM HG: CPT | Mod: CPTII,S$GLB,ICN, | Performed by: NURSE PRACTITIONER

## 2021-08-05 PROCEDURE — 1125F PR PAIN SEVERITY QUANTIFIED, PAIN PRESENT: ICD-10-PCS | Mod: CPTII,S$GLB,ICN, | Performed by: NURSE PRACTITIONER

## 2021-08-05 PROCEDURE — 99213 OFFICE O/P EST LOW 20 MIN: CPT | Mod: 25,S$GLB,ICN, | Performed by: NURSE PRACTITIONER

## 2021-08-05 PROCEDURE — 99213 PR OFFICE/OUTPT VISIT, EST, LEVL III, 20-29 MIN: ICD-10-PCS | Mod: 25,S$GLB,ICN, | Performed by: NURSE PRACTITIONER

## 2021-08-05 PROCEDURE — 3074F PR MOST RECENT SYSTOLIC BLOOD PRESSURE < 130 MM HG: ICD-10-PCS | Mod: CPTII,S$GLB,ICN, | Performed by: NURSE PRACTITIONER

## 2021-08-05 PROCEDURE — 99999 PR PBB SHADOW E&M-EST. PATIENT-LVL IV: ICD-10-PCS | Mod: PBBFAC,,, | Performed by: NURSE PRACTITIONER

## 2021-08-05 PROCEDURE — 3008F PR BODY MASS INDEX (BMI) DOCUMENTED: ICD-10-PCS | Mod: CPTII,S$GLB,ICN, | Performed by: NURSE PRACTITIONER

## 2021-08-05 PROCEDURE — 1160F RVW MEDS BY RX/DR IN RCRD: CPT | Mod: CPTII,S$GLB,ICN, | Performed by: NURSE PRACTITIONER

## 2021-08-05 PROCEDURE — 1125F AMNT PAIN NOTED PAIN PRSNT: CPT | Mod: CPTII,S$GLB,ICN, | Performed by: NURSE PRACTITIONER

## 2021-08-05 PROCEDURE — 1159F MED LIST DOCD IN RCRD: CPT | Mod: CPTII,S$GLB,ICN, | Performed by: NURSE PRACTITIONER

## 2021-08-05 PROCEDURE — 99999 PR PBB SHADOW E&M-EST. PATIENT-LVL IV: CPT | Mod: PBBFAC,,, | Performed by: NURSE PRACTITIONER

## 2021-08-05 RX ORDER — MINERAL OIL
180 ENEMA (ML) RECTAL DAILY
COMMUNITY
End: 2022-08-05

## 2021-08-05 RX ORDER — METHYLPREDNISOLONE 4 MG/1
TABLET ORAL
Qty: 21 TABLET | Refills: 0 | Status: SHIPPED | OUTPATIENT
Start: 2021-08-05 | End: 2022-08-05

## 2021-08-05 RX ORDER — BETAMETHASONE SODIUM PHOSPHATE AND BETAMETHASONE ACETATE 3; 3 MG/ML; MG/ML
9 INJECTION, SUSPENSION INTRA-ARTICULAR; INTRALESIONAL; INTRAMUSCULAR; SOFT TISSUE ONCE
Status: COMPLETED | OUTPATIENT
Start: 2021-08-05 | End: 2021-08-05

## 2021-08-05 RX ORDER — DOXYCYCLINE 100 MG/1
100 CAPSULE ORAL 2 TIMES DAILY
Qty: 10 CAPSULE | Refills: 0 | Status: SHIPPED | OUTPATIENT
Start: 2021-08-05 | End: 2021-08-10

## 2021-08-05 RX ADMIN — BETAMETHASONE SODIUM PHOSPHATE AND BETAMETHASONE ACETATE 9 MG: 3; 3 INJECTION, SUSPENSION INTRA-ARTICULAR; INTRALESIONAL; INTRAMUSCULAR; SOFT TISSUE at 03:08

## 2022-08-05 ENCOUNTER — OFFICE VISIT (OUTPATIENT)
Dept: FAMILY MEDICINE | Facility: CLINIC | Age: 32
End: 2022-08-05
Payer: COMMERCIAL

## 2022-08-05 DIAGNOSIS — S39.012A STRAIN OF LUMBAR REGION, INITIAL ENCOUNTER: Primary | ICD-10-CM

## 2022-08-05 PROCEDURE — 99213 PR OFFICE/OUTPT VISIT, EST, LEVL III, 20-29 MIN: ICD-10-PCS | Mod: 95,,, | Performed by: NURSE PRACTITIONER

## 2022-08-05 PROCEDURE — 99213 OFFICE O/P EST LOW 20 MIN: CPT | Mod: 95,,, | Performed by: NURSE PRACTITIONER

## 2022-08-05 RX ORDER — DICLOFENAC SODIUM 50 MG/1
50 TABLET, DELAYED RELEASE ORAL 2 TIMES DAILY
Qty: 20 TABLET | Refills: 0 | Status: SHIPPED | OUTPATIENT
Start: 2022-08-05 | End: 2022-08-15

## 2022-08-05 RX ORDER — CYCLOBENZAPRINE HCL 10 MG
10 TABLET ORAL 3 TIMES DAILY PRN
Qty: 30 TABLET | Refills: 0 | Status: SHIPPED | OUTPATIENT
Start: 2022-08-05 | End: 2022-08-15

## 2022-08-05 NOTE — PROGRESS NOTES
Subjective:       Patient ID: Jorge Luis Gilmore is a 32 y.o. male.    Chief Complaint: No chief complaint on file.  The patient location is: [place]   The chief complaint leading to consultation is: low back pain    Visit type: audiovisual    Face to Face time with patient: 10  10 minutes of total time spent on the encounter, which includes face to face time and non-face to face time preparing to see the patient (eg, review of tests), Obtaining and/or reviewing separately obtained history, Documenting clinical information in the electronic or other health record, Independently interpreting results (not separately reported) and communicating results to the patient/family/caregiver, or Care coordination (not separately reported).         Each patient to whom he or she provides medical services by telemedicine is:  (1) informed of the relationship between the physician and patient and the respective role of any other health care provider with respect to management of the patient; and (2) notified that he or she may decline to receive medical services by telemedicine and may withdraw from such care at any time.    Notes: Pt reports to tele medicine with chief complaint of back pain. Does not recall any remote injury or trauma.  Notes frequent heavy lifting. No paresthesia.  Notes spasms upon movement. Tried otc tylenol which has not afforded relief.    Past Medical History:   Diagnosis Date    Asthma     Delayed immunizations 4/2/2019    Paronychia of right thumb 5/23/2018    Routine general medical examination at a health care facility 4/15/2019    Viral upper respiratory tract infection 4/2/2019     Back Pain  This is a recurrent problem. The current episode started 1 to 4 weeks ago. The problem occurs constantly. The problem has been rapidly worsening since onset. The pain is present in the lumbar spine. The quality of the pain is described as aching. The pain is at a severity of 9/10. The pain is  severe. The pain is the same all the time. The symptoms are aggravated by position, standing and stress. Stiffness is present all day. Pertinent negatives include no abdominal pain, bladder incontinence, bowel incontinence, chest pain, dysuria, fever, headaches, leg pain, numbness, paresis, paresthesias, pelvic pain, perianal numbness, tingling, weakness or weight loss. The treatment provided no relief.     Review of Systems   Constitutional: Negative for fever and weight loss.   HENT: Negative.    Respiratory: Negative.    Cardiovascular: Negative for chest pain.   Gastrointestinal: Negative for abdominal pain and bowel incontinence.   Genitourinary: Negative for bladder incontinence, dysuria, hematuria and pelvic pain.   Musculoskeletal: Positive for back pain.   Neurological: Negative for tingling, weakness, numbness, headaches and paresthesias.       Objective:      Physical Exam  Constitutional:       Appearance: He is obese.   HENT:      Head: Normocephalic.      Right Ear: External ear normal.      Left Ear: External ear normal.      Mouth/Throat:      Mouth: Mucous membranes are dry.   Eyes:      Extraocular Movements: Extraocular movements intact.   Pulmonary:      Effort: Pulmonary effort is normal. No respiratory distress.   Musculoskeletal:      Cervical back: Normal range of motion.   Skin:     Coloration: Skin is not jaundiced.   Neurological:      Mental Status: He is alert and oriented to person, place, and time.         Assessment:       1. Strain of lumbar region, initial encounter        Plan:   Strain of lumbar region, initial encounter  -     cyclobenzaprine (FLEXERIL) 10 MG tablet; Take 1 tablet (10 mg total) by mouth 3 (three) times daily as needed for Muscle spasms.  Dispense: 30 tablet; Refill: 0  -     diclofenac (VOLTAREN) 50 MG EC tablet; Take 1 tablet (50 mg total) by mouth 2 (two) times daily. for 10 days  Dispense: 20 tablet; Refill: 0      No follow-ups on file.      Answers for  HPI/ROS submitted by the patient on 8/5/2022  genital pain: No

## 2022-09-28 ENCOUNTER — OFFICE VISIT (OUTPATIENT)
Dept: FAMILY MEDICINE | Facility: CLINIC | Age: 32
End: 2022-09-28
Payer: COMMERCIAL

## 2022-09-28 ENCOUNTER — PATIENT MESSAGE (OUTPATIENT)
Dept: FAMILY MEDICINE | Facility: CLINIC | Age: 32
End: 2022-09-28

## 2022-09-28 DIAGNOSIS — R41.840 LACK OF CONCENTRATION: Primary | ICD-10-CM

## 2022-09-28 PROCEDURE — 99213 OFFICE O/P EST LOW 20 MIN: CPT | Mod: 95,,, | Performed by: NURSE PRACTITIONER

## 2022-09-28 PROCEDURE — 99213 PR OFFICE/OUTPT VISIT, EST, LEVL III, 20-29 MIN: ICD-10-PCS | Mod: 95,,, | Performed by: NURSE PRACTITIONER

## 2022-09-28 NOTE — PROGRESS NOTES
"Subjective:       Patient ID: Jorge Luis Gilmore is a 32 y.o. male.    Chief Complaint: No chief complaint on file.  The patient location is: [place]   The chief complaint leading to consultation is: lack of concentration    Visit type: audiovisual    Face to Face time with patient: 10  10 minutes of total time spent on the encounter, which includes face to face time and non-face to face time preparing to see the patient (eg, review of tests), Obtaining and/or reviewing separately obtained history, Documenting clinical information in the electronic or other health record, Independently interpreting results (not separately reported) and communicating results to the patient/family/caregiver, or Care coordination (not separately reported).         Each patient to whom he or she provides medical services by telemedicine is:  (1) informed of the relationship between the physician and patient and the respective role of any other health care provider with respect to management of the patient; and (2) notified that he or she may decline to receive medical services by telemedicine and may withdraw from such care at any time.    Notes:  Pt reports via tele medicine with chief complaint decreased concentration.  Onset 6 months ago.  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  Reports difficulty studying for test.  Isolates in room to study.  Denies history of ADD as child, reports dyslexia "used to go to counseling center".    HPI  Review of Systems   Constitutional:  Negative for activity change and unexpected weight change.   HENT:  Negative for hearing loss, rhinorrhea and trouble swallowing.    Eyes:  Negative for discharge and visual disturbance.   Respiratory:  Negative for chest tightness and wheezing.    Cardiovascular:  Negative for chest pain and palpitations.   Gastrointestinal:  Negative for blood in stool, constipation, diarrhea and vomiting.   Endocrine: Negative for polydipsia and polyuria.   Genitourinary:  " Negative for difficulty urinating, hematuria and urgency.   Musculoskeletal:  Negative for arthralgias, joint swelling and neck pain.   Neurological:  Negative for weakness and headaches.   Psychiatric/Behavioral:  Negative for confusion and dysphoric mood.      Objective:      Physical Exam  Constitutional:       Appearance: He is obese.   HENT:      Head: Normocephalic.      Right Ear: External ear normal.      Left Ear: External ear normal.   Eyes:      Extraocular Movements: Extraocular movements intact.   Pulmonary:      Effort: Pulmonary effort is normal. No respiratory distress.   Skin:     Coloration: Skin is not jaundiced.   Neurological:      Mental Status: He is alert and oriented to person, place, and time.   Psychiatric:         Behavior: Behavior normal.       Assessment:       1. Lack of concentration          Plan:   Lack of concentration  -pt will verify with insurance if [facility] is accepted for evaluation    No follow-ups on file.

## 2023-03-29 ENCOUNTER — OFFICE VISIT (OUTPATIENT)
Dept: INTERNAL MEDICINE | Facility: CLINIC | Age: 33
End: 2023-03-29
Payer: COMMERCIAL

## 2023-03-29 VITALS
DIASTOLIC BLOOD PRESSURE: 78 MMHG | TEMPERATURE: 98 F | SYSTOLIC BLOOD PRESSURE: 110 MMHG | HEART RATE: 92 BPM | HEIGHT: 70 IN | WEIGHT: 281.94 LBS | OXYGEN SATURATION: 95 % | BODY MASS INDEX: 40.36 KG/M2

## 2023-03-29 DIAGNOSIS — R41.840 LACK OF CONCENTRATION: Primary | ICD-10-CM

## 2023-03-29 PROBLEM — J01.01 ACUTE RECURRENT MAXILLARY SINUSITIS: Status: RESOLVED | Noted: 2020-03-18 | Resolved: 2023-03-29

## 2023-03-29 PROCEDURE — 99213 PR OFFICE/OUTPT VISIT, EST, LEVL III, 20-29 MIN: ICD-10-PCS | Mod: S$GLB,,, | Performed by: NURSE PRACTITIONER

## 2023-03-29 PROCEDURE — 99213 OFFICE O/P EST LOW 20 MIN: CPT | Mod: S$GLB,,, | Performed by: NURSE PRACTITIONER

## 2023-03-29 PROCEDURE — 99999 PR PBB SHADOW E&M-EST. PATIENT-LVL IV: ICD-10-PCS | Mod: PBBFAC,,, | Performed by: NURSE PRACTITIONER

## 2023-03-29 PROCEDURE — 1159F PR MEDICATION LIST DOCUMENTED IN MEDICAL RECORD: ICD-10-PCS | Mod: CPTII,S$GLB,, | Performed by: NURSE PRACTITIONER

## 2023-03-29 PROCEDURE — 1160F PR REVIEW ALL MEDS BY PRESCRIBER/CLIN PHARMACIST DOCUMENTED: ICD-10-PCS | Mod: CPTII,S$GLB,, | Performed by: NURSE PRACTITIONER

## 2023-03-29 PROCEDURE — 1160F RVW MEDS BY RX/DR IN RCRD: CPT | Mod: CPTII,S$GLB,, | Performed by: NURSE PRACTITIONER

## 2023-03-29 PROCEDURE — 99999 PR PBB SHADOW E&M-EST. PATIENT-LVL IV: CPT | Mod: PBBFAC,,, | Performed by: NURSE PRACTITIONER

## 2023-03-29 PROCEDURE — 3078F PR MOST RECENT DIASTOLIC BLOOD PRESSURE < 80 MM HG: ICD-10-PCS | Mod: CPTII,S$GLB,, | Performed by: NURSE PRACTITIONER

## 2023-03-29 PROCEDURE — 3008F PR BODY MASS INDEX (BMI) DOCUMENTED: ICD-10-PCS | Mod: CPTII,S$GLB,, | Performed by: NURSE PRACTITIONER

## 2023-03-29 PROCEDURE — 3074F SYST BP LT 130 MM HG: CPT | Mod: CPTII,S$GLB,, | Performed by: NURSE PRACTITIONER

## 2023-03-29 PROCEDURE — 1159F MED LIST DOCD IN RCRD: CPT | Mod: CPTII,S$GLB,, | Performed by: NURSE PRACTITIONER

## 2023-03-29 PROCEDURE — 3008F BODY MASS INDEX DOCD: CPT | Mod: CPTII,S$GLB,, | Performed by: NURSE PRACTITIONER

## 2023-03-29 PROCEDURE — 3074F PR MOST RECENT SYSTOLIC BLOOD PRESSURE < 130 MM HG: ICD-10-PCS | Mod: CPTII,S$GLB,, | Performed by: NURSE PRACTITIONER

## 2023-03-29 PROCEDURE — 3078F DIAST BP <80 MM HG: CPT | Mod: CPTII,S$GLB,, | Performed by: NURSE PRACTITIONER

## 2023-03-29 NOTE — PROGRESS NOTES
"Subjective:       Patient ID: Jorge Luis Gilmore is a 33 y.o. male.    Chief Complaint: Anxiety (Trouble with focus)    Mr. Gilmore presents to visit for complaint of lack of concentration and problems focusing since starting new job. Would like to try something prescribed if possible. Had referral to neuropsych in September, but never received phone call about appt. Does have some anxiety, but does think he needs medication or therapy at this time.       Patient Active Problem List   Diagnosis    Lumbar strain    Chronic pansinusitis    Lack of concentration       Family History   Problem Relation Age of Onset    No Known Problems Mother     No Known Problems Father     Heart disease Maternal Grandmother     Diabetes Maternal Grandmother     Heart disease Maternal Grandfather     Prostate cancer Paternal Grandfather      History reviewed. No pertinent surgical history.    No current outpatient medications on file.    Review of Systems   Constitutional:  Negative for appetite change, chills, fatigue and fever.   Respiratory:  Negative for cough and shortness of breath.    Cardiovascular:  Negative for chest pain and palpitations.   Neurological:  Negative for dizziness, light-headedness and headaches.   Psychiatric/Behavioral:  Positive for decreased concentration. Negative for agitation, dysphoric mood, self-injury, sleep disturbance and suicidal ideas. The patient is nervous/anxious.      Objective:   /78 (BP Location: Left arm, Patient Position: Sitting, BP Method: Medium (Manual))   Pulse 92   Temp 98 °F (36.7 °C)   Ht 5' 10" (1.778 m)   Wt 127.9 kg (281 lb 15.5 oz)   SpO2 95%   BMI 40.46 kg/m²      Physical Exam  Vitals reviewed.   Constitutional:       General: He is not in acute distress.     Appearance: Normal appearance. He is well-developed. He is obese. He is not ill-appearing or diaphoretic.   HENT:      Head: Normocephalic.   Cardiovascular:      Rate and Rhythm: Normal rate and regular " "rhythm.      Pulses: Normal pulses.      Heart sounds: Normal heart sounds. No murmur heard.    No friction rub. No gallop.   Pulmonary:      Effort: Pulmonary effort is normal. No respiratory distress.      Breath sounds: Normal breath sounds. No rales.   Musculoskeletal:         General: Normal range of motion.      Cervical back: Normal range of motion and neck supple.   Skin:     General: Skin is warm and dry.      Coloration: Skin is not pale.      Findings: No erythema.   Neurological:      Mental Status: He is alert and oriented to person, place, and time.   Psychiatric:         Mood and Affect: Mood normal.         Behavior: Behavior normal.       Assessment & Plan     Problem List Items Addressed This Visit          Neuro    Lack of concentration - Primary    Current Assessment & Plan     Was never contacted by neuro [facility] after last referral. Referral sent. Number given for our psych dept. Also recommended reaching out to neuro med.          Relevant Orders    Ambulatory referral/consult to Adult Neuropsychology    Ambulatory referral/consult to Adult Neuropsychology       Follow up if symptoms worsen or fail to improve.          Portions of this note may have been created with voice recognition software. Occasional "wrong-word" or "sound-a-like" substitutions may have occurred due to the inherent limitations of voice recognition software. Please, read the note carefully and recognize, using context, where substitutions have occurred.       "

## 2023-03-29 NOTE — ASSESSMENT & PLAN NOTE
Was never contacted by neuro medical center after last referral. Referral sent. Number given for our psych dept. Also recommended reaching out to neuro med.

## 2023-06-08 ENCOUNTER — HOSPITAL ENCOUNTER (OUTPATIENT)
Dept: RADIOLOGY | Facility: HOSPITAL | Age: 33
Discharge: HOME | End: 2023-06-08
Attending: NURSE PRACTITIONER
Payer: COMMERCIAL

## 2023-06-08 ENCOUNTER — OFFICE VISIT (OUTPATIENT)
Dept: INTERNAL MEDICINE | Facility: CLINIC | Age: 33
End: 2023-06-08
Payer: COMMERCIAL

## 2023-06-08 VITALS
SYSTOLIC BLOOD PRESSURE: 116 MMHG | BODY MASS INDEX: 41.16 KG/M2 | DIASTOLIC BLOOD PRESSURE: 74 MMHG | HEART RATE: 91 BPM | WEIGHT: 287.5 LBS | TEMPERATURE: 98 F | OXYGEN SATURATION: 98 % | HEIGHT: 70 IN

## 2023-06-08 DIAGNOSIS — M54.50 ACUTE MIDLINE LOW BACK PAIN WITHOUT SCIATICA: ICD-10-CM

## 2023-06-08 DIAGNOSIS — M54.50 ACUTE MIDLINE LOW BACK PAIN WITHOUT SCIATICA: Primary | ICD-10-CM

## 2023-06-08 PROCEDURE — 1159F MED LIST DOCD IN RCRD: CPT | Mod: CPTII,S$GLB,, | Performed by: NURSE PRACTITIONER

## 2023-06-08 PROCEDURE — 3074F SYST BP LT 130 MM HG: CPT | Mod: CPTII,S$GLB,, | Performed by: NURSE PRACTITIONER

## 2023-06-08 PROCEDURE — 1160F PR REVIEW ALL MEDS BY PRESCRIBER/CLIN PHARMACIST DOCUMENTED: ICD-10-PCS | Mod: CPTII,S$GLB,, | Performed by: NURSE PRACTITIONER

## 2023-06-08 PROCEDURE — 99213 OFFICE O/P EST LOW 20 MIN: CPT | Mod: S$GLB,,, | Performed by: NURSE PRACTITIONER

## 2023-06-08 PROCEDURE — 1160F RVW MEDS BY RX/DR IN RCRD: CPT | Mod: CPTII,S$GLB,, | Performed by: NURSE PRACTITIONER

## 2023-06-08 PROCEDURE — 72100 XR LUMBAR SPINE 2 OR 3 VIEWS: ICD-10-PCS | Mod: 26,,, | Performed by: RADIOLOGY

## 2023-06-08 PROCEDURE — 99999 PR PBB SHADOW E&M-EST. PATIENT-LVL IV: CPT | Mod: PBBFAC,,, | Performed by: NURSE PRACTITIONER

## 2023-06-08 PROCEDURE — 3008F BODY MASS INDEX DOCD: CPT | Mod: CPTII,S$GLB,, | Performed by: NURSE PRACTITIONER

## 2023-06-08 PROCEDURE — 3078F PR MOST RECENT DIASTOLIC BLOOD PRESSURE < 80 MM HG: ICD-10-PCS | Mod: CPTII,S$GLB,, | Performed by: NURSE PRACTITIONER

## 2023-06-08 PROCEDURE — 72100 X-RAY EXAM L-S SPINE 2/3 VWS: CPT | Mod: TC,PO

## 2023-06-08 PROCEDURE — 3074F PR MOST RECENT SYSTOLIC BLOOD PRESSURE < 130 MM HG: ICD-10-PCS | Mod: CPTII,S$GLB,, | Performed by: NURSE PRACTITIONER

## 2023-06-08 PROCEDURE — 99213 PR OFFICE/OUTPT VISIT, EST, LEVL III, 20-29 MIN: ICD-10-PCS | Mod: S$GLB,,, | Performed by: NURSE PRACTITIONER

## 2023-06-08 PROCEDURE — 1159F PR MEDICATION LIST DOCUMENTED IN MEDICAL RECORD: ICD-10-PCS | Mod: CPTII,S$GLB,, | Performed by: NURSE PRACTITIONER

## 2023-06-08 PROCEDURE — 3078F DIAST BP <80 MM HG: CPT | Mod: CPTII,S$GLB,, | Performed by: NURSE PRACTITIONER

## 2023-06-08 PROCEDURE — 99999 PR PBB SHADOW E&M-EST. PATIENT-LVL IV: ICD-10-PCS | Mod: PBBFAC,,, | Performed by: NURSE PRACTITIONER

## 2023-06-08 PROCEDURE — 72100 X-RAY EXAM L-S SPINE 2/3 VWS: CPT | Mod: 26,,, | Performed by: RADIOLOGY

## 2023-06-08 PROCEDURE — 3008F PR BODY MASS INDEX (BMI) DOCUMENTED: ICD-10-PCS | Mod: CPTII,S$GLB,, | Performed by: NURSE PRACTITIONER

## 2023-06-08 RX ORDER — NAPROXEN 500 MG/1
500 TABLET ORAL 2 TIMES DAILY PRN
Qty: 30 TABLET | Refills: 0 | Status: SHIPPED | OUTPATIENT
Start: 2023-06-08

## 2023-06-08 RX ORDER — BACLOFEN 10 MG/1
TABLET ORAL
Qty: 30 TABLET | Refills: 0 | Status: SHIPPED | OUTPATIENT
Start: 2023-06-08

## 2023-06-08 NOTE — PROGRESS NOTES
Subjective:       Patient ID: Jorge Luis Gilmore is a 33 y.o. male.    Chief Complaint: Back Pain    Mr. Gilmore presents to visit for complaint of low back pain.  Onset him for excellently 3 months ago.  Reports that he believes it happened when he was helping his brother move.  He has tried over-the-counter NSAIDs and Tylenol with minimal relief.  Has also tried Biofreeze and stretches.  Pain is worse with walking.  Feels like it starts to tighten as he walks.  Has tried to exercise, but not able to due to back pain.  Pain intermittently radiates into bilateral buttocks.  Denies any urinary or GI symptoms.    Back Pain  This is a chronic problem. The current episode started more than 1 year ago. The problem is unchanged. The pain is present in the lumbar spine. Quality: Tightness. Radiates to: buttocks. The pain is at a severity of 8/10. The pain is moderate. Exacerbated by: walking. Stiffness is present All day. Pertinent negatives include no abdominal pain, bladder incontinence, bowel incontinence, chest pain, dysuria, fever, headaches, leg pain, numbness, paresis, paresthesias, tingling or weakness. He has tried analgesics, home exercises, NSAIDs and walking for the symptoms. The treatment provided mild relief.     Patient Active Problem List   Diagnosis    Lumbar strain    Chronic pansinusitis    Lack of concentration       Family History   Problem Relation Age of Onset    No Known Problems Mother     No Known Problems Father     Heart disease Maternal Grandmother     Diabetes Maternal Grandmother     Heart disease Maternal Grandfather     Prostate cancer Paternal Grandfather      History reviewed. No pertinent surgical history.      Current Outpatient Medications:     baclofen (LIORESAL) 10 MG tablet, Take 1-2 tablets by mouth TID PRN for muscle pain. May cause drowsiness., Disp: 30 tablet, Rfl: 0    naproxen (NAPROSYN) 500 MG tablet, Take 1 tablet (500 mg total) by mouth 2 (two) times daily as needed  "(pain.)., Disp: 30 tablet, Rfl: 0    Review of Systems   Constitutional:  Negative for appetite change, chills, fatigue and fever.   Respiratory:  Negative for shortness of breath.    Cardiovascular:  Negative for chest pain and palpitations.   Gastrointestinal:  Negative for abdominal pain, bowel incontinence, diarrhea, nausea and vomiting.   Genitourinary:  Negative for bladder incontinence, dysuria, flank pain and frequency.   Musculoskeletal:  Positive for back pain. Negative for joint swelling.   Neurological:  Negative for dizziness, tingling, weakness, light-headedness, numbness, headaches and paresthesias.     Objective:   /74 (BP Location: Left arm, Patient Position: Sitting, BP Method: X-Large (Manual))   Pulse 91   Temp 97.7 °F (36.5 °C)   Ht 5' 10" (1.778 m)   Wt 130.4 kg (287 lb 7.7 oz)   SpO2 98%   BMI 41.25 kg/m²      Physical Exam  Constitutional:       General: He is not in acute distress.     Appearance: Normal appearance. He is obese. He is not ill-appearing.   Cardiovascular:      Rate and Rhythm: Normal rate and regular rhythm.      Pulses: Normal pulses.      Heart sounds: Normal heart sounds. No murmur heard.    No friction rub. No gallop.   Pulmonary:      Effort: Pulmonary effort is normal. No respiratory distress.      Breath sounds: Normal breath sounds. No wheezing.   Abdominal:      Palpations: Abdomen is soft.      Tenderness: There is no abdominal tenderness. There is no guarding.   Musculoskeletal:      Thoracic back: Normal. No tenderness or bony tenderness. Normal range of motion.      Lumbar back: Tenderness (paraspinus muscles bilateral) present. Decreased range of motion. Negative right straight leg raise test and negative left straight leg raise test.      Right lower leg: No edema.      Left lower leg: No edema.   Skin:     General: Skin is warm and dry.      Coloration: Skin is not pale.      Findings: No erythema.   Neurological:      Mental Status: He is alert " "and oriented to person, place, and time.   Psychiatric:         Mood and Affect: Mood normal.       Assessment & Plan     Problem List Items Addressed This Visit    None  Visit Diagnoses       Acute midline low back pain without sciatica    -  Primary    Relevant Medications    baclofen (LIORESAL) 10 MG tablet    naproxen (NAPROSYN) 500 MG tablet    Other Relevant Orders    X-Ray Lumbar Spine 2 Or 3 Views    Ambulatory referral/consult to Physical/Occupational Therapy        Stretches  Heating pad  Massages  NSAID/muscle relaxer rx.       Follow up if symptoms worsen or fail to improve.          Portions of this note may have been created with voice recognition software. Occasional "wrong-word" or "sound-a-like" substitutions may have occurred due to the inherent limitations of voice recognition software. Please, read the note carefully and recognize, using context, where substitutions have occurred.       "

## 2023-07-19 ENCOUNTER — PATIENT MESSAGE (OUTPATIENT)
Dept: RESEARCH | Facility: HOSPITAL | Age: 33
End: 2023-07-19
Payer: COMMERCIAL

## 2024-11-14 ENCOUNTER — OFFICE VISIT (OUTPATIENT)
Dept: INTERNAL MEDICINE | Facility: CLINIC | Age: 34
End: 2024-11-14
Payer: COMMERCIAL

## 2024-11-14 VITALS
BODY MASS INDEX: 41.35 KG/M2 | WEIGHT: 288.81 LBS | OXYGEN SATURATION: 97 % | HEIGHT: 70 IN | HEART RATE: 87 BPM | TEMPERATURE: 97 F | SYSTOLIC BLOOD PRESSURE: 116 MMHG | DIASTOLIC BLOOD PRESSURE: 74 MMHG

## 2024-11-14 DIAGNOSIS — J01.40 SUBACUTE PANSINUSITIS: Primary | ICD-10-CM

## 2024-11-14 PROCEDURE — 99999 PR PBB SHADOW E&M-EST. PATIENT-LVL III: CPT | Mod: PBBFAC,,, | Performed by: NURSE PRACTITIONER

## 2024-11-14 RX ORDER — METHYLPREDNISOLONE 4 MG/1
TABLET ORAL
Qty: 21 TABLET | Refills: 0 | Status: SHIPPED | OUTPATIENT
Start: 2024-11-14

## 2024-11-14 RX ORDER — AZELASTINE 1 MG/ML
1 SPRAY, METERED NASAL 2 TIMES DAILY
Qty: 30 ML | Refills: 0 | Status: SHIPPED | OUTPATIENT
Start: 2024-11-14

## 2024-11-14 RX ORDER — DOXYCYCLINE 100 MG/1
100 CAPSULE ORAL 2 TIMES DAILY
Qty: 14 CAPSULE | Refills: 0 | Status: SHIPPED | OUTPATIENT
Start: 2024-11-14 | End: 2024-11-21

## 2024-11-14 NOTE — PROGRESS NOTES
Subjective:       Patient ID: Jorge Luis Gilmore is a 34 y.o. male.    Chief Complaint: Sinus Problem (X 2 months, sneezing, nasal drip, pressure behind eyes )      History of Present Illness    CHIEF COMPLAINT:  - Mr. Gilmore presents with nasal congestion and facial pressure, reporting ongoing sinus problems for a few months with no improvement.    HPI:  Mr. Gilmore reports nasal congestion and facial pressure, describing it as sinus pressure. These symptoms have been ongoing for at least a few months with no improvement. Mr. Gilmore has attempted to alleviate symptoms using various OTC medications including Advil Sinus Congestion, Zyrtec, and Mucinex nasal spray, but these treatments have not been effective. Mr. Gilmore has a history of sinus problems and previously consulted with an ENT specialist who did not recommend sinus surgery at that time. An allergist previously diagnosed the patient with a mold allergy. Mr. Gilmore recently started a new job involving exposure to dust, which may be exacerbating symptoms.    Mr. Gilmore denies fever, chills, body aches, shortness of breath, or chest pain. There is no pain or tenderness when pressure is applied to the sinus areas.              Patient Active Problem List   Diagnosis    Lumbar strain    Chronic pansinusitis    Lack of concentration         History reviewed. No pertinent surgical history.      Current Outpatient Medications:     azelastine (ASTELIN) 137 mcg (0.1 %) nasal spray, 1 spray (137 mcg total) by Nasal route 2 (two) times daily., Disp: 30 mL, Rfl: 0    doxycycline (MONODOX) 100 MG capsule, Take 1 capsule (100 mg total) by mouth 2 (two) times daily. for 7 days, Disp: 14 capsule, Rfl: 0    methylPREDNISolone (MEDROL DOSEPACK) 4 mg tablet, follow package directions, Disp: 21 tablet, Rfl: 0    Review of Systems   Constitutional:  Negative for activity change, appetite change, chills, fatigue and fever.   HENT:  Positive for congestion, ear pain,  "postnasal drip, rhinorrhea, sinus pressure and sinus pain. Negative for sore throat.    Respiratory:  Positive for cough. Negative for apnea, chest tightness, shortness of breath and wheezing.    Cardiovascular:  Negative for chest pain, palpitations and leg swelling.   Gastrointestinal:  Negative for abdominal pain, constipation, diarrhea, nausea and vomiting.   Musculoskeletal:  Negative for arthralgias, back pain and gait problem.   Neurological:  Negative for dizziness, syncope, weakness, light-headedness, numbness and headaches.   Psychiatric/Behavioral:  The patient is not nervous/anxious.        Objective:   /74 (BP Location: Left arm, Patient Position: Sitting)   Pulse 87   Temp 96.7 °F (35.9 °C) (Tympanic)   Ht 5' 10" (1.778 m)   Wt 131 kg (288 lb 12.8 oz)   SpO2 97%   BMI 41.44 kg/m²      Physical Exam  Vitals reviewed.   Constitutional:       General: He is not in acute distress.     Appearance: Normal appearance. He is well-developed. He is not ill-appearing or diaphoretic.   HENT:      Head: Normocephalic.      Right Ear: External ear normal. No middle ear effusion. Tympanic membrane is not erythematous or bulging.      Left Ear: External ear normal.  No middle ear effusion. Tympanic membrane is erythematous. Tympanic membrane is not bulging.      Nose: Mucosal edema, congestion and rhinorrhea present.      Right Turbinates: Swollen.      Left Turbinates: Swollen.      Right Sinus: Maxillary sinus tenderness and frontal sinus tenderness present.      Left Sinus: Maxillary sinus tenderness and frontal sinus tenderness present.      Mouth/Throat:      Mouth: Mucous membranes are moist.      Pharynx: Oropharynx is clear. No oropharyngeal exudate or posterior oropharyngeal erythema.   Eyes:      General:         Right eye: No discharge.         Left eye: No discharge.      Extraocular Movements: Extraocular movements intact.      Conjunctiva/sclera: Conjunctivae normal.      Pupils: Pupils are " equal, round, and reactive to light.   Cardiovascular:      Rate and Rhythm: Normal rate and regular rhythm.      Pulses: Normal pulses.      Heart sounds: Normal heart sounds. No murmur heard.     No friction rub. No gallop.   Pulmonary:      Effort: Pulmonary effort is normal. No respiratory distress.      Breath sounds: Normal breath sounds. No rales.   Musculoskeletal:      Cervical back: Normal range of motion and neck supple.   Skin:     General: Skin is warm and dry.      Coloration: Skin is not pale.      Findings: No erythema.   Neurological:      Mental Status: He is alert and oriented to person, place, and time.         Assessment & Plan     Problem List Items Addressed This Visit          ENT    Chronic pansinusitis - Primary    Relevant Medications    azelastine (ASTELIN) 137 mcg (0.1 %) nasal spray    doxycycline (MONODOX) 100 MG capsule    methylPREDNISolone (MEDROL DOSEPACK) 4 mg tablet   Tylenol/ibuprofen for pain and fever.   Hand hygiene.   Maintain adequate hydration.   Antihistamine and flonase for nasal congestion and upper respiratory symptoms.   Rest.       Assessment & Plan    MEDICAL DECISION MAKING:  - Considered patient's history of nasal congestion, facial pressure, and sinus problems  - Noted allergy to amoxicillin (diarrhea reaction)  - Considered cephalosporin as alternative antibiotic option if doxycycline ineffective  - Noted previous ENT evaluation that did not recommend sinus surgery  - Acknowledged allergy to mold and new job exposure to dust    PATIENT EDUCATION:  - Explained risk of rebound sinus congestion with decongestant nasal sprays like Sinex, Afrin, or Vicks sinus spray  - Discussed that antihistamine (azelastine) and steroid (Flonase) nasal sprays are preferable options  - Explained potential need for rotating antihistamine medications    ACTION ITEMS/LIFESTYLE:  - Mr. Gilmore to take an antihistamine daily  - Mr. Gilmore to change antihistamine brand with each new  "bottle purchase    MEDICATIONS:  - Started doxycycline (antibiotic)  - Started oral steroid  - Started azelastine nasal spray for daily use  - Continued Flonase nasal spray    FOLLOW UP:  - Contact the office if symptoms do not improve or if there are any concerns            Follow up if symptoms worsen or fail to improve.          Portions of this note may have been created with voice recognition software. Occasional "wrong-word" or "sound-a-like" substitutions may have occurred due to the inherent limitations of voice recognition software. Please, read the note carefully and recognize, using context, where substitutions have occurred.       This note was generated with the assistance of ambient listening technology. Verbal consent was obtained by the patient and accompanying visitor(s) for the recording of patient appointment to facilitate this note. I attest to having reviewed and edited the generated note for accuracy, though some syntax or spelling errors may persist. Please contact the author of this note for any clarification.       "

## 2025-07-29 ENCOUNTER — OFFICE VISIT (OUTPATIENT)
Dept: INTERNAL MEDICINE | Facility: CLINIC | Age: 35
End: 2025-07-29
Payer: COMMERCIAL

## 2025-07-29 VITALS
RESPIRATION RATE: 18 BRPM | HEIGHT: 70 IN | WEIGHT: 293.88 LBS | TEMPERATURE: 98 F | OXYGEN SATURATION: 97 % | HEART RATE: 93 BPM | DIASTOLIC BLOOD PRESSURE: 80 MMHG | BODY MASS INDEX: 42.07 KG/M2 | SYSTOLIC BLOOD PRESSURE: 100 MMHG

## 2025-07-29 DIAGNOSIS — M54.50 ACUTE MIDLINE LOW BACK PAIN WITHOUT SCIATICA: ICD-10-CM

## 2025-07-29 DIAGNOSIS — H66.001 NON-RECURRENT ACUTE SUPPURATIVE OTITIS MEDIA OF RIGHT EAR WITHOUT SPONTANEOUS RUPTURE OF TYMPANIC MEMBRANE: Primary | ICD-10-CM

## 2025-07-29 DIAGNOSIS — J01.40 ACUTE NON-RECURRENT PANSINUSITIS: ICD-10-CM

## 2025-07-29 PROCEDURE — 99999 PR PBB SHADOW E&M-EST. PATIENT-LVL IV: CPT | Mod: PBBFAC,,, | Performed by: NURSE PRACTITIONER

## 2025-07-29 RX ORDER — KETOROLAC TROMETHAMINE 30 MG/ML
30 INJECTION, SOLUTION INTRAMUSCULAR; INTRAVENOUS
Status: COMPLETED | OUTPATIENT
Start: 2025-07-29 | End: 2025-07-29

## 2025-07-29 RX ORDER — DICLOFENAC SODIUM 75 MG/1
75 TABLET, DELAYED RELEASE ORAL 2 TIMES DAILY PRN
Qty: 20 TABLET | Refills: 0 | Status: SHIPPED | OUTPATIENT
Start: 2025-07-29 | End: 2025-08-08

## 2025-07-29 RX ORDER — AZELASTINE 1 MG/ML
1 SPRAY, METERED NASAL 2 TIMES DAILY
Qty: 30 ML | Refills: 0 | Status: SHIPPED | OUTPATIENT
Start: 2025-07-29

## 2025-07-29 RX ORDER — CEFDINIR 300 MG/1
300 CAPSULE ORAL 2 TIMES DAILY
Qty: 20 CAPSULE | Refills: 0 | Status: SHIPPED | OUTPATIENT
Start: 2025-07-29 | End: 2025-08-08

## 2025-07-29 RX ORDER — CYCLOBENZAPRINE HCL 10 MG
10 TABLET ORAL 3 TIMES DAILY PRN
Qty: 20 TABLET | Refills: 0 | Status: SHIPPED | OUTPATIENT
Start: 2025-07-29 | End: 2025-08-05

## 2025-07-29 RX ADMIN — KETOROLAC TROMETHAMINE 30 MG: 30 INJECTION, SOLUTION INTRAMUSCULAR; INTRAVENOUS at 09:07

## 2025-07-29 NOTE — PROGRESS NOTES
Subjective:       Patient ID: Jorge Luis Gilmore is a 35 y.o. male.    Chief Complaint: Ear Fullness (right)    History of Present Illness    HPI:  Mr. Gilmore reports two main concerns: a clogged right ear and lower back pain. The ear issue began on Sunday, with patient describing it as feeling obstructed. He denies any drainage from the ear. Mr. Gilmore mentions chronic upper respiratory symptoms throughout the year and initially thought he might have gotten water in his ear during a rainy game he attended.    The back pain started a few months ago in the lower back area. Initially intermittent, it has now become constant. Mr. Gilmore describes the pain as a tightening and aching sensation, with severity reaching 8-9 out of 10 at its worst. Certain movements, such as bending over or twisting, exacerbate the pain. On one occasion, when reaching down, the pain was extremely severe. Mr. Gilmore has tried using Biofreeze, which provides some relief. He attempted icing, but it did not help. Over-the-counter pain medications offer a minimal relief. Mr. Gilmore likens the sensation to a muscle strain.    Mr. Gilmore denies any previous episodes of back pain of this severity. He reports that getting out of the shower and bending to  a towel caused a particularly sharp pain.    Mr. Gilmore denies fever, chills, body aches, numbness, tingling, weakness in the legs, and problems with urination or bowel movements.          Otalgia   There is pain in the right ear. The current episode started in the past 7 days. There has been no fever. Associated symptoms include rhinorrhea. Pertinent negatives include no ear discharge or hearing loss.   Back Pain  This is a new problem. The current episode started more than 1 month ago. The problem occurs constantly. The pain is present in the lumbar spine. The quality of the pain is described as aching. The pain is at a severity of 8/10. The symptoms are aggravated by bending  "and twisting. Pertinent negatives include no bladder incontinence, bowel incontinence, dysuria, fever, tingling or weakness.       Problem List[1]    Family History   Problem Relation Name Age of Onset    No Known Problems Mother      No Known Problems Father      Heart disease Maternal Grandmother      Diabetes Maternal Grandmother      Heart disease Maternal Grandfather      Prostate cancer Paternal Grandfather       History reviewed. No pertinent surgical history.    Current Medications[2]    Review of Systems   Constitutional:  Negative for chills and fever.   HENT:  Positive for congestion, ear pain, rhinorrhea and sinus pressure. Negative for ear discharge, hearing loss and trouble swallowing.    Respiratory:  Negative for shortness of breath.    Gastrointestinal:  Negative for bowel incontinence.   Genitourinary:  Negative for bladder incontinence and dysuria.   Musculoskeletal:  Positive for back pain.   Neurological:  Negative for tingling and weakness.       Objective:   /80   Pulse 93   Temp 97.8 °F (36.6 °C) (Tympanic)   Resp 18   Ht 5' 10" (1.778 m)   Wt 133.3 kg (293 lb 14 oz)   SpO2 97%   BMI 42.17 kg/m²      Physical Exam  Vitals reviewed.   Constitutional:       Appearance: Normal appearance. He is obese. He is not ill-appearing.   HENT:      Head: Normocephalic and atraumatic.      Right Ear: Ear canal normal. A middle ear effusion is present. Tympanic membrane is bulging. Tympanic membrane is not erythematous.      Left Ear: Tympanic membrane and ear canal normal.  No middle ear effusion. Tympanic membrane is not erythematous.      Nose: Mucosal edema and congestion present. No rhinorrhea.      Right Sinus: Maxillary sinus tenderness and frontal sinus tenderness present.      Left Sinus: Maxillary sinus tenderness and frontal sinus tenderness present.      Mouth/Throat:      Mouth: Mucous membranes are moist.      Pharynx: Uvula midline. Posterior oropharyngeal erythema (mild) " present. No oropharyngeal exudate.   Eyes:      General:         Right eye: No discharge.         Left eye: No discharge.   Cardiovascular:      Rate and Rhythm: Normal rate and regular rhythm.      Heart sounds: Normal heart sounds. No murmur heard.  Pulmonary:      Effort: Pulmonary effort is normal. No respiratory distress.      Breath sounds: Normal breath sounds. No wheezing, rhonchi or rales.   Musculoskeletal:      Cervical back: Normal range of motion. No rigidity.      Lumbar back: Tenderness present. Positive right straight leg raise test.        Back:    Lymphadenopathy:      Cervical: No cervical adenopathy.   Skin:     General: Skin is warm and dry.      Coloration: Skin is not pale.      Findings: No erythema or rash.   Neurological:      Mental Status: He is alert and oriented to person, place, and time.   Psychiatric:         Mood and Affect: Mood normal.         Behavior: Behavior normal. Behavior is cooperative.         Assessment & Plan     Assessment & Plan    RIGHT EAR OTITIS MEDIA:  - Mr. Gilmore reports feeling of ear being clogged, with no drainage, fever, chills, or body aches.  - Diagnosed otitis media based on exam findings.  - Prescribed Cefdinir for 10 days to treat the ear infection.    LOW BACK PAIN:  - Mr. Gilmore reports constant lower back pain with aching and tightening, pain level 8-9/10, worsened by bending over.  - Examination revealed tenderness and pain on right side during leg lift.  - Administered Toradol injection in clinic for immediate pain relief.  - Prescribed Diclofenac BID and Cyclobenzaprine (muscle relaxer) to be taken up to 3 times daily as needed, discussed potential drowsiness side effect.  - Advised patient to avoid activities that exacerbate back pain, including heavy lifting and bending over.    CHRONIC SINUSITIS:  - Mr. Gilmore reports chronic upper respiratory symptoms throughout the year with sinus pressure.  - Currently uses Flonase and Astelin nasal  "sprays but reports running out of Astelin.  - Refilled Astelin nasal spray and continued Flonase, which can be used in rotation.  - Explained the benefit of rotating antihistamines for chronic allergies.  - Start Omnicef for sinusitis          1. Non-recurrent acute suppurative otitis media of right ear without spontaneous rupture of tympanic membrane  -     cefdinir (OMNICEF) 300 MG capsule; Take 1 capsule (300 mg total) by mouth 2 (two) times daily. for 10 days  Dispense: 20 capsule; Refill: 0    2. Acute non-recurrent pansinusitis  -     cefdinir (OMNICEF) 300 MG capsule; Take 1 capsule (300 mg total) by mouth 2 (two) times daily. for 10 days  Dispense: 20 capsule; Refill: 0  -     azelastine (ASTELIN) 137 mcg (0.1 %) nasal spray; 1 spray (137 mcg total) by Nasal route 2 (two) times daily.  Dispense: 30 mL; Refill: 0    3. Acute midline low back pain without sciatica  -     cyclobenzaprine (FLEXERIL) 10 MG tablet; Take 1 tablet (10 mg total) by mouth 3 (three) times daily as needed for Muscle spasms.  Dispense: 20 tablet; Refill: 0  -     diclofenac (VOLTAREN) 75 MG EC tablet; Take 1 tablet (75 mg total) by mouth 2 (two) times daily as needed (pain).  Dispense: 20 tablet; Refill: 0  -     ketorolac injection 30 mg            AUGUSTINE Kirk    This note was generated with the assistance of ambient listening technology. Verbal consent was obtained by the patient and accompanying visitor(s) for the recording of patient appointment to facilitate this note. I attest to having reviewed and edited the generated note for accuracy, though some syntax or spelling errors may persist. Please contact the author of this note for any clarification.       Portions of this note may have been created with voice recognition software. Occasional "wrong-word" or "sound-a-like" substitutions may have occurred due to the inherent limitations of voice recognition software. Please, read the note carefully and recognize, using " context, where substitutions have occurred.             [1]   Patient Active Problem List  Diagnosis    Lumbar strain    Chronic pansinusitis    Lack of concentration   [2]   Current Outpatient Medications:     azelastine (ASTELIN) 137 mcg (0.1 %) nasal spray, 1 spray (137 mcg total) by Nasal route 2 (two) times daily., Disp: 30 mL, Rfl: 0    cefdinir (OMNICEF) 300 MG capsule, Take 1 capsule (300 mg total) by mouth 2 (two) times daily. for 10 days, Disp: 20 capsule, Rfl: 0    cyclobenzaprine (FLEXERIL) 10 MG tablet, Take 1 tablet (10 mg total) by mouth 3 (three) times daily as needed for Muscle spasms., Disp: 20 tablet, Rfl: 0    diclofenac (VOLTAREN) 75 MG EC tablet, Take 1 tablet (75 mg total) by mouth 2 (two) times daily as needed (pain)., Disp: 20 tablet, Rfl: 0